# Patient Record
Sex: MALE | Race: WHITE | NOT HISPANIC OR LATINO | Employment: OTHER | URBAN - METROPOLITAN AREA
[De-identification: names, ages, dates, MRNs, and addresses within clinical notes are randomized per-mention and may not be internally consistent; named-entity substitution may affect disease eponyms.]

---

## 2023-08-28 ENCOUNTER — HOSPITAL ENCOUNTER (OUTPATIENT)
Dept: DATA CONVERSION | Facility: HOSPITAL | Age: 88
Discharge: HOME | End: 2023-08-28
Payer: MEDICARE

## 2023-08-28 DIAGNOSIS — D51.9 VITAMIN B12 DEFICIENCY ANEMIA, UNSPECIFIED: ICD-10-CM

## 2023-08-28 DIAGNOSIS — I10 ESSENTIAL (PRIMARY) HYPERTENSION: ICD-10-CM

## 2023-08-28 DIAGNOSIS — E78.00 PURE HYPERCHOLESTEROLEMIA, UNSPECIFIED: ICD-10-CM

## 2023-08-28 DIAGNOSIS — E11.9 TYPE 2 DIABETES MELLITUS WITHOUT COMPLICATIONS (MULTI): ICD-10-CM

## 2023-08-28 LAB
ALBUMIN SERPL-MCNC: 4.4 GM/DL (ref 3.5–5)
ALBUMIN/GLOB SERPL: 1.6 RATIO (ref 1.5–3)
ALP BLD-CCNC: 61 U/L (ref 35–125)
ALT SERPL-CCNC: 22 U/L (ref 5–40)
ANION GAP SERPL CALCULATED.3IONS-SCNC: 14 MMOL/L (ref 0–19)
APPEARANCE PLAS: ABNORMAL
AST SERPL-CCNC: 12 U/L (ref 5–40)
BACTERIA UR QL AUTO: NEGATIVE
BILIRUB DIRECT SERPL-MCNC: 0.2 MG/DL (ref 0–0.2)
BILIRUB INDIRECT SERPL-MCNC: 0.8 MG/DL (ref 0–0.8)
BILIRUB SERPL-MCNC: 1 MG/DL (ref 0.1–1.2)
BILIRUB UR QL STRIP.AUTO: NEGATIVE
BILIRUB UR QL STRIP.AUTO: NEGATIVE
BUN SERPL-MCNC: 23 MG/DL (ref 8–25)
BUN/CREAT SERPL: 17.7 RATIO (ref 8–21)
CALCIUM SERPL-MCNC: 9.8 MG/DL (ref 8.5–10.4)
CHLORIDE SERPL-SCNC: 107 MMOL/L (ref 97–107)
CHOLEST SERPL-MCNC: 213 MG/DL (ref 133–200)
CHOLEST/HDLC SERPL: 2.7 RATIO
CLARITY UR: CLEAR
CLARITY UR: CLEAR
CO2 SERPL-SCNC: 26 MMOL/L (ref 24–31)
COLOR SPUN FLD: ABNORMAL
COLOR UR: NORMAL
COLOR UR: YELLOW
CREAT SERPL-MCNC: 1.3 MG/DL (ref 0.4–1.6)
FASTING STATUS PATIENT QL REPORTED: ABNORMAL
GFR SERPL CREATININE-BSD FRML MDRD: 54 ML/MIN/1.73 M2
GLOBULIN SER-MCNC: 2.8 G/DL (ref 1.9–3.7)
GLUCOSE SERPL-MCNC: 114 MG/DL (ref 65–99)
GLUCOSE UR STRIP.AUTO-MCNC: NEGATIVE MG/DL
GLUCOSE UR STRIP.AUTO-MCNC: NEGATIVE MG/DL
HBA1C MFR BLD: 6.4 % (ref 4–6)
HDLC SERPL-MCNC: 78 MG/DL
HGB UR QL STRIP.AUTO: 1 /HPF (ref 0–3)
HGB UR QL: NEGATIVE
HGB UR QL: NEGATIVE
HYALINE CASTS UR QL AUTO: NORMAL /LPF
KETONES UR QL STRIP.AUTO: NEGATIVE
KETONES UR QL STRIP.AUTO: NEGATIVE
LDLC SERPL CALC-MCNC: 121 MG/DL (ref 65–130)
LEUKOCYTE ESTERASE UR QL STRIP.AUTO: NEGATIVE
LEUKOCYTE ESTERASE UR QL STRIP.AUTO: NEGATIVE
MICROSCOPIC (UA): NORMAL
NITRITE UR QL STRIP.AUTO: NEGATIVE
NITRITE UR QL STRIP.AUTO: NEGATIVE
PH UR STRIP.AUTO: 5.5 [PH] (ref 4.6–8)
PH UR STRIP.AUTO: 5.5 [PH] (ref 4.6–8)
POTASSIUM SERPL-SCNC: 4.8 MMOL/L (ref 3.4–5.1)
PROT SERPL-MCNC: 7.2 G/DL (ref 5.9–7.9)
PROT UR STRIP.AUTO-MCNC: NEGATIVE MG/DL
PROT UR STRIP.AUTO-MCNC: NEGATIVE MG/DL
REFLEX MICROSCOPIC (UA): NORMAL
SODIUM SERPL-SCNC: 146 MMOL/L (ref 133–145)
SP GR UR STRIP.AUTO: 1.02 (ref 1–1.03)
SP GR UR STRIP.AUTO: 1.02 (ref 1–1.03)
SQUAMOUS UR QL AUTO: NORMAL /HPF
TRIGL SERPL-MCNC: 71 MG/DL (ref 40–150)
URINE CULTURE: NORMAL
UROBILINOGEN UR QL STRIP.AUTO: NORMAL MG/DL (ref 0–1)
UROBILINOGEN UR QL STRIP.AUTO: NORMAL MG/DL (ref 0–1)
VIT B12 SERPL-MCNC: 1124 PG/ML (ref 211–946)
WBC #/AREA URNS AUTO: 1 /HPF (ref 0–3)

## 2023-09-13 PROBLEM — Z86.79 HISTORY OF HYPERTENSION: Status: ACTIVE | Noted: 2023-09-13

## 2023-09-13 PROBLEM — I25.10 CORONARY ARTERY DISEASE INVOLVING NATIVE CORONARY ARTERY OF NATIVE HEART WITHOUT ANGINA PECTORIS: Status: ACTIVE | Noted: 2023-09-13

## 2023-09-13 PROBLEM — J45.909 ASTHMA (HHS-HCC): Status: ACTIVE | Noted: 2023-09-13

## 2023-09-13 PROBLEM — M70.62 GREATER TROCHANTERIC BURSITIS OF LEFT HIP: Status: ACTIVE | Noted: 2023-09-13

## 2023-09-13 PROBLEM — Z86.11 HISTORY OF TUBERCULOSIS: Status: ACTIVE | Noted: 2023-09-13

## 2023-09-13 PROBLEM — Z95.5 HISTORY OF CORONARY ARTERY STENT PLACEMENT: Status: ACTIVE | Noted: 2023-09-13

## 2023-09-13 PROBLEM — I10 ESSENTIAL HYPERTENSION: Status: ACTIVE | Noted: 2023-09-13

## 2023-09-13 PROBLEM — C18.7 CANCER OF SIGMOID COLON (MULTI): Status: ACTIVE | Noted: 2023-09-13

## 2023-09-13 PROBLEM — M75.42 IMPINGEMENT SYNDROME, SHOULDER, LEFT: Status: ACTIVE | Noted: 2023-09-13

## 2023-09-13 PROBLEM — I11.9 BENIGN HYPERTENSIVE HEART DISEASE WITHOUT HEART FAILURE: Status: ACTIVE | Noted: 2023-09-13

## 2023-09-13 PROBLEM — K56.699 STRICTURE OF COLON (MULTI): Status: ACTIVE | Noted: 2023-09-13

## 2023-09-13 PROBLEM — I35.8 AORTIC VALVE SCLEROSIS: Status: ACTIVE | Noted: 2023-09-13

## 2023-09-13 PROBLEM — E78.5 HYPERLIPIDEMIA: Status: ACTIVE | Noted: 2023-09-13

## 2023-09-13 RX ORDER — NIRMATRELVIR AND RITONAVIR 150-100 MG
KIT ORAL
COMMUNITY
Start: 2023-03-21 | End: 2023-10-04 | Stop reason: ALTCHOICE

## 2023-09-13 RX ORDER — EPINEPHRINE 0.22MG
AEROSOL WITH ADAPTER (ML) INHALATION
COMMUNITY

## 2023-09-13 RX ORDER — ALBUTEROL SULFATE 90 UG/1
AEROSOL, METERED RESPIRATORY (INHALATION)
COMMUNITY
Start: 2020-12-07

## 2023-09-13 RX ORDER — AMOXICILLIN 500 MG/1
CAPSULE ORAL
COMMUNITY
Start: 2023-07-15 | End: 2023-10-04 | Stop reason: ALTCHOICE

## 2023-09-13 RX ORDER — SIMVASTATIN 20 MG/1
TABLET, FILM COATED ORAL
COMMUNITY
End: 2023-11-17

## 2023-09-13 RX ORDER — LOTEPREDNOL ETABONATE 3.8 MG/G
GEL OPHTHALMIC
COMMUNITY
Start: 2021-12-13 | End: 2023-10-04 | Stop reason: ALTCHOICE

## 2023-09-13 RX ORDER — PNV NO.95/FERROUS FUM/FOLIC AC 28MG-0.8MG
TABLET ORAL
COMMUNITY
End: 2023-10-04 | Stop reason: ALTCHOICE

## 2023-09-13 RX ORDER — KETOTIFEN FUMARATE 0.35 MG/ML
SOLUTION/ DROPS OPHTHALMIC
COMMUNITY
End: 2024-04-24 | Stop reason: WASHOUT

## 2023-09-13 RX ORDER — ALBUTEROL SULFATE 4 MG/1
4 TABLET ORAL DAILY
COMMUNITY
End: 2024-02-06

## 2023-09-13 RX ORDER — METHYLPREDNISOLONE 4 MG/1
TABLET ORAL
COMMUNITY
Start: 2021-08-10 | End: 2023-10-04 | Stop reason: ALTCHOICE

## 2023-09-13 RX ORDER — CEPHALEXIN 500 MG/1
CAPSULE ORAL
COMMUNITY
End: 2023-10-04 | Stop reason: ALTCHOICE

## 2023-09-13 RX ORDER — TAMSULOSIN HYDROCHLORIDE 0.4 MG/1
0.4 CAPSULE ORAL DAILY
COMMUNITY

## 2023-09-13 RX ORDER — AMLODIPINE BESYLATE 5 MG/1
5 TABLET ORAL DAILY
COMMUNITY
End: 2023-11-11

## 2023-09-13 RX ORDER — NAPROXEN SODIUM 220 MG/1
TABLET, FILM COATED ORAL
COMMUNITY

## 2023-09-13 RX ORDER — CYANOCOBALAMIN 1000 UG/ML
INJECTION, SOLUTION INTRAMUSCULAR; SUBCUTANEOUS
COMMUNITY
End: 2023-10-30

## 2023-09-13 RX ORDER — FLUTICASONE PROPIONATE AND SALMETEROL 100; 50 UG/1; UG/1
POWDER RESPIRATORY (INHALATION)
COMMUNITY
Start: 2021-10-29 | End: 2023-10-04 | Stop reason: ALTCHOICE

## 2023-09-18 ENCOUNTER — DOCUMENTATION (OUTPATIENT)
Dept: PRIMARY CARE | Facility: CLINIC | Age: 88
End: 2023-09-18
Payer: MEDICARE

## 2023-09-25 ENCOUNTER — HOSPITAL ENCOUNTER (OUTPATIENT)
Dept: DATA CONVERSION | Facility: HOSPITAL | Age: 88
Discharge: HOME | End: 2023-09-25
Payer: MEDICARE

## 2023-09-30 DIAGNOSIS — G89.29 CHRONIC PAIN OF LEFT KNEE: Primary | ICD-10-CM

## 2023-09-30 DIAGNOSIS — M25.552 LEFT HIP PAIN: Chronic | ICD-10-CM

## 2023-09-30 DIAGNOSIS — Z96.652 PRESENCE OF LEFT ARTIFICIAL KNEE JOINT: ICD-10-CM

## 2023-09-30 DIAGNOSIS — M25.562 CHRONIC PAIN OF LEFT KNEE: Primary | ICD-10-CM

## 2023-10-04 ENCOUNTER — APPOINTMENT (OUTPATIENT)
Dept: PHYSICAL THERAPY | Facility: CLINIC | Age: 88
End: 2023-10-04
Payer: MEDICARE

## 2023-10-04 ENCOUNTER — OFFICE VISIT (OUTPATIENT)
Dept: PRIMARY CARE | Facility: CLINIC | Age: 88
End: 2023-10-04
Payer: MEDICARE

## 2023-10-04 VITALS
SYSTOLIC BLOOD PRESSURE: 124 MMHG | BODY MASS INDEX: 26.66 KG/M2 | HEART RATE: 87 BPM | HEIGHT: 69 IN | OXYGEN SATURATION: 96 % | TEMPERATURE: 98.4 F | DIASTOLIC BLOOD PRESSURE: 70 MMHG | WEIGHT: 180 LBS

## 2023-10-04 DIAGNOSIS — J06.9 ACUTE UPPER RESPIRATORY INFECTION: Primary | ICD-10-CM

## 2023-10-04 DIAGNOSIS — R05.1 ACUTE COUGH: ICD-10-CM

## 2023-10-04 PROCEDURE — 1159F MED LIST DOCD IN RCRD: CPT | Performed by: FAMILY MEDICINE

## 2023-10-04 PROCEDURE — 1160F RVW MEDS BY RX/DR IN RCRD: CPT | Performed by: FAMILY MEDICINE

## 2023-10-04 PROCEDURE — 1036F TOBACCO NON-USER: CPT | Performed by: FAMILY MEDICINE

## 2023-10-04 PROCEDURE — 1126F AMNT PAIN NOTED NONE PRSNT: CPT | Performed by: FAMILY MEDICINE

## 2023-10-04 PROCEDURE — 3074F SYST BP LT 130 MM HG: CPT | Performed by: FAMILY MEDICINE

## 2023-10-04 PROCEDURE — 99212 OFFICE O/P EST SF 10 MIN: CPT | Performed by: FAMILY MEDICINE

## 2023-10-04 PROCEDURE — 3078F DIAST BP <80 MM HG: CPT | Performed by: FAMILY MEDICINE

## 2023-10-04 RX ORDER — LISINOPRIL 5 MG/1
TABLET ORAL
COMMUNITY
End: 2024-04-24 | Stop reason: WASHOUT

## 2023-10-04 ASSESSMENT — PAIN SCALES - GENERAL: PAINLEVEL: 0-NO PAIN

## 2023-10-04 ASSESSMENT — PATIENT HEALTH QUESTIONNAIRE - PHQ9
2. FEELING DOWN, DEPRESSED OR HOPELESS: NOT AT ALL
1. LITTLE INTEREST OR PLEASURE IN DOING THINGS: NOT AT ALL
SUM OF ALL RESPONSES TO PHQ9 QUESTIONS 1 AND 2: 0

## 2023-10-04 ASSESSMENT — ENCOUNTER SYMPTOMS: COUGH: 1

## 2023-10-04 NOTE — PROGRESS NOTES
"Subjective   Patient ID: Buck Leiva is a 88 y.o. male who presents for Cough (X 3 days ) and Nasal Congestion.  Today is day 4 of the illness.  He admits to a productive cough with yellow and green sputum (only in the morning) and nasal congestion.  He denies rhinorrhea, SOB, fever, chills, throat pain, otalgia, ear pressure, sinus pressure, and headaches.  He is taking Mucinex which helps.  He is using the albuterol inhaler 4 times a day, not so much as he needs but because it says to use 4 times a day on the inhaler.  He denies sick contacts.  He took a home COVID-19 test yesterday, and it was negative.      Review of Systems   HENT:  Positive for congestion.    Respiratory:  Positive for cough.      Objective   /70 (BP Location: Left arm)   Pulse 87   Temp 36.9 °C (98.4 °F) (Temporal)   Ht 1.753 m (5' 9\")   Wt 81.6 kg (180 lb)   SpO2 96%   BMI 26.58 kg/m²     Physical Exam  Constitutional:       Appearance: Normal appearance.   HENT:      Right Ear: Hearing, tympanic membrane, ear canal and external ear normal.      Left Ear: Hearing, tympanic membrane, ear canal and external ear normal.      Nose: Congestion present. No rhinorrhea.      Right Turbinates: Swollen.      Left Turbinates: Swollen.      Comments: Nasal mucosa erythematous.       Mouth/Throat:      Mouth: Mucous membranes are moist.      Pharynx: Oropharynx is clear.   Eyes:      Conjunctiva/sclera: Conjunctivae normal.   Cardiovascular:      Rate and Rhythm: Normal rate and regular rhythm.   Pulmonary:      Effort: Pulmonary effort is normal.      Breath sounds: Normal breath sounds.   Neurological:      Mental Status: He is alert.       Assessment/Plan   Problem List Items Addressed This Visit    None  Visit Diagnoses         Codes    Acute upper respiratory infection    -  Primary J06.9    Acute cough     R05.1        New.  Home COVID-19 test negative.  Most likely viral.    Continue with Mucinex.  Recommended OTC Flonase.   Use " albuterol only as needed for SOB and coughing episodes.    Adequate hydration.  Get plenty of rest.  Follow up in 3-5 days with PCP if symptoms worsen.

## 2023-10-10 ENCOUNTER — TREATMENT (OUTPATIENT)
Dept: PHYSICAL THERAPY | Facility: CLINIC | Age: 88
End: 2023-10-10
Payer: MEDICARE

## 2023-10-10 DIAGNOSIS — Z96.652 PRESENCE OF LEFT ARTIFICIAL KNEE JOINT: ICD-10-CM

## 2023-10-10 DIAGNOSIS — M25.562 CHRONIC PAIN OF LEFT KNEE: Primary | ICD-10-CM

## 2023-10-10 DIAGNOSIS — M25.552 LEFT HIP PAIN: Chronic | ICD-10-CM

## 2023-10-10 DIAGNOSIS — G89.29 CHRONIC PAIN OF LEFT KNEE: Primary | ICD-10-CM

## 2023-10-10 DIAGNOSIS — M54.50 LOW BACK PAIN: ICD-10-CM

## 2023-10-10 PROCEDURE — 97110 THERAPEUTIC EXERCISES: CPT | Mod: GP,KX | Performed by: PHYSICAL THERAPIST

## 2023-10-10 ASSESSMENT — PAIN - FUNCTIONAL ASSESSMENT: PAIN_FUNCTIONAL_ASSESSMENT: 0-10

## 2023-10-10 NOTE — PROGRESS NOTES
Physical Therapy Treatment    Patient Name: Buck Leiva  MRN: [id number]  Today's Date: 10/10/2023  Time Calculation  Start Time: 1415 (visit 2/10)  Stop Time: 1500  Time Calculation (min): 45 min  PT Therapeutic Procedures Time Entry  Therapeutic Exercise Time Entry: 40  Visit 2 of 10  Current Problem   1. Chronic pain of left knee        2. Low back pain  PT eval and treat      3. Left hip pain        4. Presence of left artificial knee joint            Subjective   General   General Comment: Pt states he is not having trouble with HEP, missed his last appt 2/2 feeling under the weather.  Pain   Pain Assessment: 0-10    Objective   Findings: Lumbar hypomobility and muscle imbalances, L knee stiffness s/p TKA (11/16/22), impaired gait and balance    Treatments:  Therapeutic Exercise: 40 minutes  Tband sidesteps and monsters -red 2 laps ea  Lean on counter hip ext 2x10- red  STS without UE 1x10, 1x8 muscle fatigue  7in step ups 2x10 with UE fwd  HR/TR's  LTR  Foam toe taps  Hurdles F/L   DKTC stretches (no bouncing)  Pirif stretching with opposite bent knee  PPT, PPT with hip add, PPT with march  DL bridge  MANUAL:  Knee ext mobilizations  5 min  Assessment:   PT Assessment  Assessment Comment: Pt tolerated session well, needs frequent cues forproper exercise form and body mechanics.Decreased knee extension.     Plan:   OP PT Plan  Treatment/Interventions: Education/ Instruction, Electrical stimulation, Dry needling, Manual therapy, Neuromuscular re-education, Self care/ home management, Therapeutic exercises, Ultrasound  PT Plan: Skilled PT  PT Frequency: 2 times per week  Duration: 6-8wks    Goals:     LTGs to be completed by 10 visits:  1) Pt will score less than 10% impairment on Low Back Disability Questionnaire to indicate significant improvement in low back function.  2) Pt will improve bilateral hip strength to at least 4+/5 for all major muscle groups for improved functional strength with general mobility,  walking, and stairs  3) Pt will display symmetrical pain-free gait without AD and without compensation.  4) Pt will display L knee ROM of at least 0-120 for less difficulty walking and with general mobility.

## 2023-10-10 NOTE — PROGRESS NOTES
Subjective      Chief Complaint   Patient presents with    annual appt        HPI seen to evaluate his known coronary artery disease and cardiomyopathy, and he remains angina free with no signs of heart failure.    Previous note: Has a history of remote circumflex stenting in 2008, and was instructed to have nuclear stress testing at his previous visit.  He is nuclear stress test October 27, 2022 showed no evidence of stress-induced ischemia and left ventricular ejection fraction estimated at 70%.    Review of Systems   All other systems reviewed and are negative.       Objective   Physical Exam  Constitutional:       Appearance: Normal appearance.   HENT:      Head: Normocephalic and atraumatic.   Eyes:      Pupils: Pupils are equal, round, and reactive to light.   Cardiovascular:      Rate and Rhythm: Normal rate and regular rhythm.      Pulses: Normal pulses.      Heart sounds: Normal heart sounds.   Pulmonary:      Effort: Pulmonary effort is normal.      Breath sounds: Normal breath sounds.   Abdominal:      General: Abdomen is flat. Bowel sounds are normal.      Palpations: Abdomen is soft.   Musculoskeletal:         General: Normal range of motion.      Cervical back: Normal range of motion.   Skin:     General: Skin is warm and dry.   Neurological:      General: No focal deficit present.   Psychiatric:         Mood and Affect: Mood normal.         Judgment: Judgment normal.          Lab Review:   Not applicable    History of coronary artery stent placement  Stable cardiac status without angina and will continue on his present medication including aspirin and statin therapy.    Essential hypertension  Continue a commitment to daily walking/exercise and a low carbohydrate [place]/Mediterranean dietary lifestyle for hypertension management cardiac risk reduction

## 2023-10-12 ENCOUNTER — TREATMENT (OUTPATIENT)
Dept: PHYSICAL THERAPY | Facility: CLINIC | Age: 88
End: 2023-10-12
Payer: MEDICARE

## 2023-10-12 DIAGNOSIS — M25.552 LEFT HIP PAIN: Chronic | ICD-10-CM

## 2023-10-12 DIAGNOSIS — G89.29 CHRONIC PAIN OF LEFT KNEE: Primary | ICD-10-CM

## 2023-10-12 DIAGNOSIS — M25.562 CHRONIC PAIN OF LEFT KNEE: Primary | ICD-10-CM

## 2023-10-12 DIAGNOSIS — Z96.652 PRESENCE OF LEFT ARTIFICIAL KNEE JOINT: ICD-10-CM

## 2023-10-12 DIAGNOSIS — M54.50 LOW BACK PAIN: ICD-10-CM

## 2023-10-12 PROCEDURE — 97110 THERAPEUTIC EXERCISES: CPT | Mod: GP,KX | Performed by: PHYSICAL THERAPIST

## 2023-10-12 ASSESSMENT — PAIN - FUNCTIONAL ASSESSMENT: PAIN_FUNCTIONAL_ASSESSMENT: 0-10

## 2023-10-12 ASSESSMENT — PAIN SCALES - GENERAL: PAINLEVEL_OUTOF10: 0 - NO PAIN

## 2023-10-12 NOTE — PROGRESS NOTES
Physical Therapy Treatment    Patient Name: Buck Leiva  MRN: [id number]  Today's Date: 10/12/2023  Time Calculation  Start Time: 1500  Stop Time: 1548  Time Calculation (min): 48 min  PT Therapeutic Procedures Time Entry  Therapeutic Exercise Time Entry: 48  Visit 3 of 10  Current Problem   1. Chronic pain of left knee        2. Low back pain  PT eval and treat      3. Left hip pain        4. Presence of left artificial knee joint            Subjective   General   Reason for Referral: L knee pain  General Comment: Pt reports he was sore after  last visit, but it was muscle soreness, not knee pain.  Pain   Pain Assessment: 0-10  Pain Score: 0 - No pain    Objective   Findings: Lumbar hypomobility and muscle imbalances, L knee stiffness s/p TKA (11/16/22), impaired gait and balance    Treatments:  Therapeutic Exercise: 40 minutes  Tband sidesteps and monsters -red 2 laps ea  Lean on counter hip ext 2x10- red  STS without UE 1x10, 1x8 muscle fatigue  7in step ups 2x10 with UE fwd  Shuttle press 87.5-150# x 12 ea DBL, SL 62.5# 2x12 L  HR/TR's 3 way x10ea  LTR  Foam toe taps  Hurdles F/L 6in  Pirif stretching with opposite bent knee  PPT, PPT with hip add, PPT with march, SLR's  Tball DKTC stretches (no bouncing) x20  DL bridge x10  DL bridge with green band around knees 2x10  Tball bridge ball at feet to promote knee extension 2x12  MANUAL:  Knee ext mobilizations  5 min  Assessment:   PT Assessment  Assessment Comment: Pt tolerated session well, needs frequent cues for proper exercise form and body mechanics. Pt denied any knee pain during todays session.Decreased knee extension.     Plan:   OP PT Plan  Treatment/Interventions: Education/ Instruction, Electrical stimulation, Dry needling, Manual therapy, Neuromuscular re-education, Self care/ home management, Therapeutic exercises, Ultrasound  PT Plan: Skilled PT  PT Frequency: 2 times per week  Duration: 6-8wks    Goals:   Active       PT Problem       STG's        Start:  09/30/23    Expected End:  10/14/23       - Independent with HEP.         LTG's       Start:  09/30/23    Expected End:  11/25/23       1) Pt will score less than 10% impairment on Low Back Disability Questionnaire to indicate significant improvement in low back function.  2) Pt will improve bilateral hip strength to at least 4+/5 for all major muscle groups for improved functional strength with general mobility, walking, and stairs  3) Pt will display symmetrical pain-free gait without AD and without compensation.  4) Pt will display L knee ROM of at least 0-120 for less difficulty walking and with general mobility.           LTGs to be completed by 10 visits:

## 2023-10-16 ENCOUNTER — OFFICE VISIT (OUTPATIENT)
Dept: CARDIOLOGY | Facility: CLINIC | Age: 88
End: 2023-10-16
Payer: MEDICARE

## 2023-10-16 ENCOUNTER — PATIENT OUTREACH (OUTPATIENT)
Dept: PRIMARY CARE | Facility: CLINIC | Age: 88
End: 2023-10-16

## 2023-10-16 VITALS
DIASTOLIC BLOOD PRESSURE: 84 MMHG | WEIGHT: 178 LBS | OXYGEN SATURATION: 96 % | BODY MASS INDEX: 26.29 KG/M2 | HEART RATE: 82 BPM | SYSTOLIC BLOOD PRESSURE: 140 MMHG

## 2023-10-16 DIAGNOSIS — J44.9 CHRONIC OBSTRUCTIVE PULMONARY DISEASE, UNSPECIFIED COPD TYPE (MULTI): ICD-10-CM

## 2023-10-16 DIAGNOSIS — Z95.5 HISTORY OF CORONARY ARTERY STENT PLACEMENT: ICD-10-CM

## 2023-10-16 DIAGNOSIS — I25.10 CORONARY ARTERY DISEASE INVOLVING NATIVE CORONARY ARTERY OF NATIVE HEART WITHOUT ANGINA PECTORIS: Primary | ICD-10-CM

## 2023-10-16 DIAGNOSIS — E78.5 HYPERLIPIDEMIA, UNSPECIFIED HYPERLIPIDEMIA TYPE: ICD-10-CM

## 2023-10-16 DIAGNOSIS — M19.90 ARTHRITIS: ICD-10-CM

## 2023-10-16 DIAGNOSIS — I10 ESSENTIAL HYPERTENSION: ICD-10-CM

## 2023-10-16 DIAGNOSIS — C18.7 CANCER OF SIGMOID COLON (MULTI): ICD-10-CM

## 2023-10-16 DIAGNOSIS — I25.119 CORONARY ARTERY DISEASE WITH ANGINA PECTORIS, UNSPECIFIED VESSEL OR LESION TYPE, UNSPECIFIED WHETHER NATIVE OR TRANSPLANTED HEART (CMS-HCC): ICD-10-CM

## 2023-10-16 DIAGNOSIS — I10 HYPERTENSION, UNSPECIFIED TYPE: ICD-10-CM

## 2023-10-16 PROCEDURE — 3079F DIAST BP 80-89 MM HG: CPT | Performed by: INTERNAL MEDICINE

## 2023-10-16 PROCEDURE — 1160F RVW MEDS BY RX/DR IN RCRD: CPT | Performed by: INTERNAL MEDICINE

## 2023-10-16 PROCEDURE — 1159F MED LIST DOCD IN RCRD: CPT | Performed by: INTERNAL MEDICINE

## 2023-10-16 PROCEDURE — 3077F SYST BP >= 140 MM HG: CPT | Performed by: INTERNAL MEDICINE

## 2023-10-16 PROCEDURE — 99214 OFFICE O/P EST MOD 30 MIN: CPT | Performed by: INTERNAL MEDICINE

## 2023-10-16 PROCEDURE — 1126F AMNT PAIN NOTED NONE PRSNT: CPT | Performed by: INTERNAL MEDICINE

## 2023-10-16 PROCEDURE — 1036F TOBACCO NON-USER: CPT | Performed by: INTERNAL MEDICINE

## 2023-10-16 ASSESSMENT — PAIN SCALES - GENERAL: PAINLEVEL: 0-NO PAIN

## 2023-10-16 NOTE — PATIENT INSTRUCTIONS
History of coronary artery stent placement  Stable cardiac status without angina and will continue on his present medication including aspirin and statin therapy.    Essential hypertension  Continue a commitment to daily walking/exercise and a low carbohydrate [place]/Mediterranean dietary lifestyle for hypertension management cardiac risk reduction    With me in 9 months or sooner for active cardiac issues

## 2023-10-16 NOTE — PROGRESS NOTES
Spoke with patient spouse who states they saw cardiologist today and it went well, follow up again in 9 months. Patient has started physical therapy again for his L knee, lower back pain and L hip pain. Patient continues with cough and some drainage, spouse states she now has it also and knows it may take some time to go away. No concerns at this time and agreeable to follow up calls.

## 2023-10-16 NOTE — ASSESSMENT & PLAN NOTE
Stable cardiac status without angina and will continue on his present medication including aspirin and statin therapy.

## 2023-10-16 NOTE — ASSESSMENT & PLAN NOTE
Continue a commitment to daily walking/exercise and a low carbohydrate [place]/Mediterranean dietary lifestyle for hypertension management cardiac risk reduction

## 2023-10-17 ENCOUNTER — TREATMENT (OUTPATIENT)
Dept: PHYSICAL THERAPY | Facility: CLINIC | Age: 88
End: 2023-10-17
Payer: MEDICARE

## 2023-10-17 DIAGNOSIS — Z96.652 PRESENCE OF LEFT ARTIFICIAL KNEE JOINT: ICD-10-CM

## 2023-10-17 DIAGNOSIS — M25.562 CHRONIC PAIN OF LEFT KNEE: Primary | ICD-10-CM

## 2023-10-17 DIAGNOSIS — M54.50 LOW BACK PAIN: ICD-10-CM

## 2023-10-17 DIAGNOSIS — G89.29 CHRONIC PAIN OF LEFT KNEE: Primary | ICD-10-CM

## 2023-10-17 DIAGNOSIS — M25.552 LEFT HIP PAIN: Chronic | ICD-10-CM

## 2023-10-17 PROCEDURE — 97140 MANUAL THERAPY 1/> REGIONS: CPT | Mod: GP,KX

## 2023-10-17 PROCEDURE — 97110 THERAPEUTIC EXERCISES: CPT | Mod: GP,KX

## 2023-10-17 NOTE — PROGRESS NOTES
"Physical Therapy Treatment    Patient Name: Buck Leiva  MRN: [id number]  Today's Date: 10/17/2023  Visit 4 of 10  Time Calculation  Start Time: 1345  Stop Time: 1430  Time Calculation (min): 45 min  PT Therapeutic Procedures Time Entry  Manual Therapy Time Entry: 10  Therapeutic Exercise Time Entry: 30   Current Problem   1. Chronic pain of left knee        2. Low back pain  PT eval and treat      3. Left hip pain        4. Presence of left artificial knee joint            Subjective   General    Patient feeling fatigued today. Had covid shot a few days ago and has felt wiped out ever since. Hasn't done his HEP over the past few days.   Pain    No pain, just stiff.    Objective   Findings: Lumbar hypomobility and muscle imbalances, L knee stiffness s/p TKA (11/16/22), impaired gait and balance. R trunk lean with standing and ambulation. Cane on R.    Treatments:  Therapeutic Exercise:  NuStep L5 - 6 min  Standing hip abd x10  Tband sidesteps and monsters -red 2 laps ea  Lean on counter hip ext 2x10- red  STS without UE 2x10  LTR  SKTC stretch 2x30\" R/L  Tball DKTC (no bouncing) x20  Tball bridge ball at feet to promote knee extension 2x12  Open book x12 R/L  PPT  DL bridge x12  HL Blue Tband clam x20  DL bridge with blue band around knees x12    DNP  7in step ups 2x10 with UE fwd  Shuttle press 87.5-150# x 12 ea DBL, SL 62.5# 2x12 L  HR/TR's 3 way x10ea  Foam toe taps  Hurdles F/L 6in  Pirif stretching with opposite bent knee  PPT, PPT with hip add, PPT with march, SLR's    MANUAL:  Knee ext mobilizations, patellar mobs    Assessment:    Patient requires frequent VC to slow down, and to not bounce into end-range motion or stretching. L trunk lean with weight bearing activities. Decreased knee extension, treated and improved with knee ext mobs. Tolerated MT and exercise additions well.     Plan:    Continue core/LE strength and mobility.     Goals:   Active       PT Problem       STG's       Start:  09/30/23    " Expected End:  10/14/23       - Independent with HEP.         LTG's       Start:  09/30/23    Expected End:  11/25/23       1) Pt will score less than 10% impairment on Low Back Disability Questionnaire to indicate significant improvement in low back function.  2) Pt will improve bilateral hip strength to at least 4+/5 for all major muscle groups for improved functional strength with general mobility, walking, and stairs  3) Pt will display symmetrical pain-free gait without AD and without compensation.  4) Pt will display L knee ROM of at least 0-120 for less difficulty walking and with general mobility.           LTGs to be completed by 10 visits:

## 2023-10-19 ENCOUNTER — TREATMENT (OUTPATIENT)
Dept: PHYSICAL THERAPY | Facility: CLINIC | Age: 88
End: 2023-10-19
Payer: MEDICARE

## 2023-10-19 DIAGNOSIS — M54.50 LOW BACK PAIN: ICD-10-CM

## 2023-10-19 DIAGNOSIS — M25.552 LEFT HIP PAIN: Chronic | ICD-10-CM

## 2023-10-19 DIAGNOSIS — M25.562 CHRONIC PAIN OF LEFT KNEE: Primary | ICD-10-CM

## 2023-10-19 DIAGNOSIS — Z96.652 PRESENCE OF LEFT ARTIFICIAL KNEE JOINT: ICD-10-CM

## 2023-10-19 DIAGNOSIS — G89.29 CHRONIC PAIN OF LEFT KNEE: Primary | ICD-10-CM

## 2023-10-19 PROCEDURE — 97110 THERAPEUTIC EXERCISES: CPT | Mod: GP,KX

## 2023-10-19 NOTE — PROGRESS NOTES
"Physical Therapy Treatment    Patient Name: Buck Leiva  MRN: [id number]  Today's Date: 10/19/2023  Visit 5 of 10  Time Calculation  Start Time: 1520  Stop Time: 1600  Time Calculation (min): 40 min  PT Therapeutic Procedures Time Entry  Therapeutic Exercise Time Entry: 40   Current Problem   1. Chronic pain of left knee        2. Low back pain  PT eval and treat      3. Left hip pain        4. Presence of left artificial knee joint          Precautions:  L TKA on 11/16/22, recent fall leading up to therapy. Patient is forgetful and requires frequent verbal and tactile cues for proper exercise technique and sequencing with activities.    Subjective   General    Doing better today, still tired since recent covid shot.   Pre-treatment Pain:    No pain, just stiff in the back    Objective   Findings: Lumbar hypomobility and muscle imbalances, L hip weakness and L knee stiffness s/p TKA (11/16/22), impaired gait and balance. R trunk lean with standing and ambulation. Cane on R.    Treatments:  Therapeutic Exercise:  NuStep L6 - 6 min  HSC 50# 2x10   Leg press 30-40-50# 3x12  Standing hip abd at couter 2x10  Tband sidesteps and fw monsters -yellow 2 laps ea  Lean on counter hip ext 2x10- red  HR/TR's 3 way x10ea  Foam 5\" step taps (light to no UE)  7in step ups 2x10 with UE fwd  LTR  Tball DKTC (no bouncing) x20  Tball bridge ball x15  PPT x15    DNP  STS without UE 2x10  SKTC stretch 2x30\" R/L  Open book x12 R/L  DL bridge x12  HL Blue Tband clam x20  DL bridge with blue band around knees x12  Shuttle press 87.5-150# x 12 ea DBL, SL 62.5# 2x12 L  Hurdles F/L 6in  Pirif stretching with opposite bent knee    MANUAL:  Knee ext mobilizations, patellar mobs    Assessment:     Patient lacks core and functional L hip and LE strength. L trunk lean with weight bearing activities. Heavier UE assist with L step ups.     Plan:    Continue general core/LE strength and mobility.     Goals:   Active       PT Problem       STG's       " Start:  09/30/23    Expected End:  10/14/23       - Independent with HEP.         LTG's       Start:  09/30/23    Expected End:  11/25/23       1) Pt will score less than 10% impairment on Low Back Disability Questionnaire to indicate significant improvement in low back function.  2) Pt will improve bilateral hip strength to at least 4+/5 for all major muscle groups for improved functional strength with general mobility, walking, and stairs  3) Pt will display symmetrical pain-free gait without AD and without compensation.  4) Pt will display L knee ROM of at least 0-120 for less difficulty walking and with general mobility.

## 2023-10-24 ENCOUNTER — TREATMENT (OUTPATIENT)
Dept: PHYSICAL THERAPY | Facility: CLINIC | Age: 88
End: 2023-10-24
Payer: MEDICARE

## 2023-10-24 DIAGNOSIS — M25.562 CHRONIC PAIN OF LEFT KNEE: Primary | ICD-10-CM

## 2023-10-24 DIAGNOSIS — M25.552 LEFT HIP PAIN: Chronic | ICD-10-CM

## 2023-10-24 DIAGNOSIS — M54.50 LOW BACK PAIN: ICD-10-CM

## 2023-10-24 DIAGNOSIS — G89.29 CHRONIC PAIN OF LEFT KNEE: Primary | ICD-10-CM

## 2023-10-24 DIAGNOSIS — Z96.652 PRESENCE OF LEFT ARTIFICIAL KNEE JOINT: ICD-10-CM

## 2023-10-24 PROCEDURE — 97110 THERAPEUTIC EXERCISES: CPT | Mod: GP,KX

## 2023-10-24 NOTE — PROGRESS NOTES
"Physical Therapy Treatment    Patient Name: Buck Leiva  MRN: [id number]  Today's Date: 10/24/2023  Visit 6 of 10  Time Calculation  Start Time: 1300  Stop Time: 1345  Time Calculation (min): 45 min  PT Therapeutic Procedures Time Entry  Therapeutic Exercise Time Entry: 42   Current Problem   1. Chronic pain of left knee        2. Low back pain  PT eval and treat      3. Left hip pain        4. Presence of left artificial knee joint        Precautions:  L TKA on 11/16/22, recent fall leading up to therapy. Patient is forgetful and requires frequent verbal and tactile cues for proper exercise technique and sequencing with activities.    Subjective   General    Back is feeling better lately. Thinks the clinic and home exercises are helping.   Pre-treatment Pain:    No pain, just stiff in the back    Objective   Findings: Lumbar hypomobility and muscle imbalances, L hip weakness and L knee stiffness s/p TKA (11/16/22), impaired gait and balance. R trunk lean with standing and ambulation. Cane on R.    Treatments:  Therapeutic Exercise:  Rec Bike L5 - 6 min  Tband sidesteps and fw monsters - orange 2 laps ea  Lean on counter hip ext 3x10  HSC 50# 3x10   Leg press 30-40-50# 3x12  HR/TR's 2x15 ea  Standing row FT 7.5# 2x12   Standing FT stirs 7.5# x10 R/L  LTR  Tball DKTC (no bouncing) 2x20  Tball bridge ball 2x15  PPT x15  SAQ 4# 2x15  Active SLR 2x10 (ext lag)    DNP  Foam 5\" step taps (light to no UE)  7in step ups 2x10 with UE fwd  STS without UE 2x10  SKTC stretch 2x30\" R/L  Open book x12 R/L  DL bridge x12  HL Blue Tband clam x20  DL bridge with blue band around knees x12  Shuttle press 87.5-150# x 12 ea DBL, SL 62.5# 2x12 L  Hurdles F/L 6in  Pirif stretching with opposite bent knee    MANUAL:  Knee ext mobilizations, patellar mobs    Assessment:     Fairly good tolerance to exercise progressions. Tcs and Vcs for proper core activation and maintaining good posture with FT rows and stirs. Continues to lack TKE ROM " and strength, with slight extensor lag observed with SLR.     Plan:    Continue general core/LE strength and mobility.     Goals:   Active       PT Problem       STG's       Start:  09/30/23    Expected End:  10/14/23       - Independent with HEP.         LTG's       Start:  09/30/23    Expected End:  11/25/23       1) Pt will score less than 10% impairment on Low Back Disability Questionnaire to indicate significant improvement in low back function.  2) Pt will improve bilateral hip strength to at least 4+/5 for all major muscle groups for improved functional strength with general mobility, walking, and stairs  3) Pt will display symmetrical pain-free gait without AD and without compensation.  4) Pt will display L knee ROM of at least 0-120 for less difficulty walking and with general mobility.

## 2023-10-26 ENCOUNTER — TREATMENT (OUTPATIENT)
Dept: PHYSICAL THERAPY | Facility: CLINIC | Age: 88
End: 2023-10-26
Payer: MEDICARE

## 2023-10-26 DIAGNOSIS — M25.552 LEFT HIP PAIN: Chronic | ICD-10-CM

## 2023-10-26 DIAGNOSIS — G89.29 CHRONIC PAIN OF LEFT KNEE: Primary | ICD-10-CM

## 2023-10-26 DIAGNOSIS — Z96.652 PRESENCE OF LEFT ARTIFICIAL KNEE JOINT: ICD-10-CM

## 2023-10-26 DIAGNOSIS — M25.562 CHRONIC PAIN OF LEFT KNEE: Primary | ICD-10-CM

## 2023-10-26 PROCEDURE — 97110 THERAPEUTIC EXERCISES: CPT | Mod: GP,CQ,KX

## 2023-10-26 NOTE — PROGRESS NOTES
"Physical Therapy Treatment    Patient Name: Buck Leiva  MRN: [id number]  Today's Date: 10/26/2023  Visit 7 of 10  Time Calculation  Start Time: 1118  Stop Time: 1200  Time Calculation (min): 42 min  PT Therapeutic Procedures Time Entry  Therapeutic Exercise Time Entry: 40   Current Problem   1. Chronic pain of left knee        2. Left hip pain        3. Presence of left artificial knee joint          Precautions:  L TKA on 11/16/22, recent fall leading up to therapy. Patient is forgetful and requires frequent verbal and tactile cues for proper exercise technique and sequencing with activities.    Subjective   General    Getting better he thinks, defiantly not getting any worse. Pt states he has no pain, just really stiff in low back.  Pre-treatment Pain:    No pain, just stiff in the back    Objective   Findings: Lumbar hypomobility and muscle imbalances, L hip weakness and L knee stiffness s/p TKA (11/16/22), impaired gait and balance. R trunk lean with standing and ambulation. Cane on R.    Treatments:  Therapeutic Exercise:  Nustep L5 6 min  HSC 50# 2x10 , 55-60# 2x10  Leg press 30-40-50# 3x12  7in step ups 2x12 with UE fwd  Foam 5\" step taps (light to no UE)  HR/TR's 2x15 ea  Tband sidesteps and fw monsters - orange 2 laps ea  Active SLR 2x10 (ext lag)  LTR x20    DNP:  Lean on counter hip ext 3x10  PPT x15  SAQ 4# 2x15  Standing row FT 7.5# 2x12   Standing FT stirs 7.5# x10 R/L  Rec Bike L5 - 6 min  Tball DKTC (no bouncing) 2x20  Tball bridge ball 2x15  STS without UE 2x10  SKTC stretch 2x30\" R/L  Open book x12 R/L  DL bridge x12  HL Blue Tband clam x20  DL bridge with blue band around knees x12  Shuttle press 87.5-150# x 12 ea DBL, SL 62.5# 2x12 L  Hurdles F/L 6in  Pirif stretching with opposite bent knee    MANUAL:  Knee ext mobilizations, patellar mobs    Assessment:     Pt tolerated increased resistance with leg curl today, no complaints of any increased pain. Pt was able to complete foam step taps without " UE support, no LOB. Pt issued additional HEP for home, TB SS, TB monster walk, and SLR.     Plan:    Continue general core/LE strength and mobility.

## 2023-10-28 DIAGNOSIS — I10 ESSENTIAL HYPERTENSION: Primary | ICD-10-CM

## 2023-10-30 RX ORDER — CYANOCOBALAMIN 1000 UG/ML
INJECTION, SOLUTION INTRAMUSCULAR; SUBCUTANEOUS
Qty: 4 ML | Refills: 7 | Status: SHIPPED | OUTPATIENT
Start: 2023-10-30

## 2023-10-31 ENCOUNTER — TREATMENT (OUTPATIENT)
Dept: PHYSICAL THERAPY | Facility: CLINIC | Age: 88
End: 2023-10-31
Payer: MEDICARE

## 2023-10-31 DIAGNOSIS — G89.29 CHRONIC PAIN OF LEFT KNEE: Primary | ICD-10-CM

## 2023-10-31 DIAGNOSIS — M25.562 CHRONIC PAIN OF LEFT KNEE: Primary | ICD-10-CM

## 2023-10-31 DIAGNOSIS — Z96.652 PRESENCE OF LEFT ARTIFICIAL KNEE JOINT: ICD-10-CM

## 2023-10-31 DIAGNOSIS — M25.552 LEFT HIP PAIN: ICD-10-CM

## 2023-10-31 PROCEDURE — 97110 THERAPEUTIC EXERCISES: CPT | Mod: GP,CQ,KX

## 2023-10-31 NOTE — PROGRESS NOTES
"Physical Therapy Treatment    Patient Name: Buck Leiva  MRN: [id number]  Today's Date: 10/31/2023  Visit 8 of 10  Time Calculation  Start Time: 1203  Stop Time: 1244  Time Calculation (min): 41 min  PT Therapeutic Procedures Time Entry  Therapeutic Exercise Time Entry: 38   Current Problem   1. Chronic pain of left knee        2. Left hip pain  Follow Up In Physical Therapy      3. Presence of left artificial knee joint          Precautions:  L TKA on 11/16/22, recent fall leading up to therapy. Patient is forgetful and requires frequent verbal and tactile cues for proper exercise technique and sequencing with activities.    Subjective   General    Pt states he has no pain or soreness today, nothing more than his usual age related soreness. Pt reports he does his HEP everyday, wondering if it is ok for him to take a day off every couple days.     Pre-treatment Pain:    0/10    Objective   Findings: Lumbar hypomobility and muscle imbalances, L hip weakness and L knee stiffness s/p TKA (11/16/22), impaired gait and balance. R trunk lean with standing and ambulation. Cane on R.    Treatments:  Therapeutic Exercise:  Nustep L5 6 min  Lean on counter hip extension- no resitance x10 , yellow x10 R/L  Shuttle press # x12 ,150# x10 , SL 62.5# 2x12 R/L  HR/TR's 2x15 ea  Modified SLS airex and 7\"  Hurdles //bars 6\"- step to and reciprocal (min UE)   STS without UE x10  Standing row purple 2x15    DNP:  HSC 50# 2x10 , 55-60# 2x10  Leg press 30-40-50# 3x12  7in step ups 2x12 with UE fwd  Foam 5\" step taps (light to no UE)  Tband sidesteps and fw monsters - orange 2 laps ea  Active SLR 2x10 (ext lag)  LTR x20  PPT x15  SAQ 4# 2x15  Standing FT stirs 7.5# x10 R/L  Rec Bike L5 - 6 min  Tball DKTC (no bouncing) 2x20  Tball bridge ball 2x15  SKTC stretch 2x30\" R/L  Open book x12 R/L  DL bridge x12  HL Blue Tband clam x20  DL bridge with blue band around knees x12  Pirif stretching with opposite bent knee    MANUAL: DNP  Knee " ext mobilizations, patellar mobs    Assessment:     Pt did well with all exercises today, no complaints of pain in L hip or knee. Pt encouraged to take breaks from exercises at home if feeling tired.     Post Paint:   0/10    Plan:    Continue general core/LE strength and mobility.

## 2023-11-03 ENCOUNTER — TREATMENT (OUTPATIENT)
Dept: PHYSICAL THERAPY | Facility: CLINIC | Age: 88
End: 2023-11-03
Payer: MEDICARE

## 2023-11-03 DIAGNOSIS — G89.29 CHRONIC PAIN OF LEFT KNEE: Primary | ICD-10-CM

## 2023-11-03 DIAGNOSIS — Z96.652 PRESENCE OF LEFT ARTIFICIAL KNEE JOINT: ICD-10-CM

## 2023-11-03 DIAGNOSIS — M25.562 CHRONIC PAIN OF LEFT KNEE: Primary | ICD-10-CM

## 2023-11-03 DIAGNOSIS — M25.552 LEFT HIP PAIN: ICD-10-CM

## 2023-11-03 PROCEDURE — 97110 THERAPEUTIC EXERCISES: CPT | Mod: GP,CQ,KX

## 2023-11-03 ASSESSMENT — PAIN - FUNCTIONAL ASSESSMENT: PAIN_FUNCTIONAL_ASSESSMENT: 0-10

## 2023-11-03 ASSESSMENT — PAIN SCALES - GENERAL: PAINLEVEL_OUTOF10: 0 - NO PAIN

## 2023-11-03 NOTE — PROGRESS NOTES
"Physical Therapy Treatment    Patient Name: Buck Leiva  MRN: [id number]  Today's Date: 11/3/2023  Visit 8 of 10  Time Calculation  Start Time: 1115  Stop Time: 1200  Time Calculation (min): 45 min  PT Therapeutic Procedures Time Entry  Therapeutic Exercise Time Entry: 45   Current Problem   1. Chronic pain of left knee        2. Left hip pain  Follow Up In Physical Therapy      3. Presence of left artificial knee joint            Precautions:  L TKA on 11/16/22, recent fall leading up to therapy. Patient is forgetful and requires frequent verbal and tactile cues for proper exercise technique and sequencing with activities.    Subjective   General   General Comment: Doing well today. nothing new to report.  Does not use cane at homePt states he has no pain or soreness today, nothing more than his usual age related soreness. Pt reports he does his HEP everyday, wondering if it is ok for him to take a day off every couple days.     Pre-treatment Pain:   Pain Assessment: 0-10  Pain Score: 0 - No pain0/10   Just has stiffness    Objective   Findings: Lumbar hypomobility and muscle imbalances, L hip weakness and L knee stiffness s/p TKA (11/16/22), impaired gait and balance. R trunk lean with standing and ambulation. Cane on R.    Treatments:  Therapeutic Exercise:  Nustep L5 6 min  Lean on counter hip extension- no resitance x10 , yellow x10 R/L  Shuttle press # x12 ,150# x10 , SL 62.5# 2x12 R/L  HSC 50# 2x10 , 55-60# 3x10  SAQ 5# 2x15  HR/TR's 2x15 ea  Modified SLS airex and 7\"  Hurdles //bars 6\"- step to and reciprocal (min UE)   STS without UE x10  Standing row purple 2x15  Resisted gait 10# fwd bkwd 5x each  Stepping stones  Walking in cardiac rehab room 4 min to work on step length and gait fluidity with cane    DNP:    Leg press 30-40-50# 3x12  7in step ups 2x12 with UE fwd  Foam 5\" step taps (light to no UE)  Tband sidesteps and fw monsters - orange 2 laps ea  Active SLR 2x10 (ext lag)  LTR x20  PPT " "x15  Standing FT stirs 7.5# x10 R/L  Rec Bike L5 - 6 min  Tball DKTC (no bouncing) 2x20  Tball bridge ball 2x15  SKTC stretch 2x30\" R/L  Open book x12 R/L  DL bridge x12  HL Blue Tband clam x20  DL bridge with blue band around knees x12  Pirif stretching with opposite bent knee    Assessment:   PT Assessment  Assessment Comment: Good effort with session. Cues to increase step length for more fluid gait Pt did well with all exercises today, no complaints of pain in L hip or knee. Pt encouraged to take breaks from exercises at home if feeling tired. Cues to go slow needed with certain therex    Post Paint:   0/10    Plan:   OP PT Plan  PT Plan: Skilled PTContinue general core/LE strength and mobility.      "

## 2023-11-06 ENCOUNTER — TREATMENT (OUTPATIENT)
Dept: PHYSICAL THERAPY | Facility: CLINIC | Age: 88
End: 2023-11-06
Payer: MEDICARE

## 2023-11-06 DIAGNOSIS — M25.562 CHRONIC PAIN OF LEFT KNEE: Primary | ICD-10-CM

## 2023-11-06 DIAGNOSIS — M25.552 LEFT HIP PAIN: ICD-10-CM

## 2023-11-06 DIAGNOSIS — G89.29 CHRONIC PAIN OF LEFT KNEE: Primary | ICD-10-CM

## 2023-11-06 DIAGNOSIS — Z96.652 PRESENCE OF LEFT ARTIFICIAL KNEE JOINT: ICD-10-CM

## 2023-11-06 PROCEDURE — 97110 THERAPEUTIC EXERCISES: CPT | Mod: GP,CQ,KX

## 2023-11-06 NOTE — PROGRESS NOTES
"Physical Therapy Treatment Progress Note    Patient Name: Buck Leiva  MRN: [id number]  Today's Date: 11/6/2023  Visit 10 of 18  Time Calculation  Start Time: 1415  Stop Time: 1459  Time Calculation (min): 44 min  PT Therapeutic Procedures Time Entry  Therapeutic Exercise Time Entry: 38   Current Problem   1. Chronic pain of left knee        2. Left hip pain  Follow Up In Physical Therapy      3. Presence of left artificial knee joint          Precautions:  L TKA on 11/16/22, recent fall leading up to therapy. Patient is forgetful and requires frequent verbal and tactile cues for proper exercise technique and sequencing with activities.    Subjective   General    Pt states the L knee and low back are feeling stiff today, stairs seemed to cause the knee to stiffen up more than usual. Pt feels that he has improved since starting therapy, not feeling as stiff and is feeling stronger in LE's.    Pre-treatment Pain:    Very stiff low back and L knee    Objective   ROM   Knee: Left: Flexion: AROM 130, Extension: AROM 2      Outcome Measures   Low Back Disability / Oswestry: 42% (was 48%)      Findings: Lumbar hypomobility and muscle imbalances, L hip weakness and L knee stiffness s/p TKA (11/16/22), impaired gait and balance. R trunk lean with standing and ambulation. Cane on R.    Treatments:  Therapeutic Exercise:  Nustep L5 6 min  HSC 55-60-65# 3x10  Shuttle press -150# 2x12 , SL 62.5# 2x10 R/L  LTR x20  SKTC stretch 4x10\" R/L  SAQ 5# 2x15 R/L  Quad sets and heel slides (before ROM measurements)    DNP:  Lean on counter hip extension- no resitance x10 , yellow x10 R/L  HR/TR's 2x15 ea  Modified SLS airex and 7\"  Hurdles //bars 6\"- step to and reciprocal (min UE)   STS without UE x10  Standing row purple 2x15  Resisted gait 10# fwd bkwd 5x each  Stepping stones  Walking in cardiac rehab room 4 min to work on step length and gait fluidity with cane  Leg press 30-40-50# 3x12  7in step ups 2x12 with UE fwd  Foam 5\" " step taps (light to no UE)  Tband sidesteps and fw monsters - orange 2 laps ea  Active SLR 2x10 (ext lag)  PPT x15  Standing FT stirs 7.5# x10 R/L  Rec Bike L5 - 6 min  Tball DKTC (no bouncing) 2x20  Tball bridge ball 2x15  Open book x12 R/L  DL bridge x12  HL Blue Tband clam x20  DL bridge with blue band around knees x12  Pirif stretching with opposite bent knee    Assessment:     Pt had a slight 6% improvement on LBDQ from initial evaluation. Pt has improved L knee ROM, just short of full extension and WFL for flexion. Pt will continue to benefit from skilled PT to reduced pain/stiffness in lumbar spine and L knee.     Post Pain:   Much looser    Plan:    Patient would benefit from continued PT 1-2x/week for up to 18 total visits to address remaining observed core/LE strength and mobility deficits to improve safety and efficiency of movement and ambulation.      Goals:   Active       PT Problem       STG's (Met)       Start:  09/30/23    Expected End:  10/14/23    Resolved:  11/07/23    - Independent with HEP.         LTG's (Progressing)       Start:  09/30/23    Expected End:  12/29/23       1) Pt will score less than 10% impairment on Low Back Disability Questionnaire to indicate significant improvement in low back function.  2) Pt will improve bilateral hip strength to at least 4+/5 for all major muscle groups for improved functional strength with general mobility, walking, and stairs  3) Pt will display symmetrical pain-free gait without AD and without compensation.  4) Pt will display L knee ROM of at least 0-120 for less difficulty walking and with general mobility.

## 2023-11-09 ENCOUNTER — TREATMENT (OUTPATIENT)
Dept: PHYSICAL THERAPY | Facility: CLINIC | Age: 88
End: 2023-11-09
Payer: MEDICARE

## 2023-11-09 DIAGNOSIS — G89.29 CHRONIC PAIN OF LEFT KNEE: Primary | ICD-10-CM

## 2023-11-09 DIAGNOSIS — Z96.652 PRESENCE OF LEFT ARTIFICIAL KNEE JOINT: ICD-10-CM

## 2023-11-09 DIAGNOSIS — M25.552 LEFT HIP PAIN: ICD-10-CM

## 2023-11-09 DIAGNOSIS — M25.562 CHRONIC PAIN OF LEFT KNEE: Primary | ICD-10-CM

## 2023-11-09 PROCEDURE — 97110 THERAPEUTIC EXERCISES: CPT | Mod: GP,KX

## 2023-11-09 NOTE — PROGRESS NOTES
"Physical Therapy Treatment Progress Note    Patient Name: Buck Leiva  MRN: [id number]  Today's Date: 11/9/2023  Visit 11 of 18  Time Calculation  Start Time: 1345  Stop Time: 1430  Time Calculation (min): 45 min  PT Therapeutic Procedures Time Entry  Therapeutic Exercise Time Entry: 41     Current Problem   1. Chronic pain of left knee  Follow Up In Physical Therapy      2. Left hip pain  Follow Up In Physical Therapy    Follow Up In Physical Therapy      3. Presence of left artificial knee joint  Follow Up In Physical Therapy        Precautions:  L TKA on 11/16/22, recent fall leading up to therapy. Patient is forgetful and requires frequent verbal and tactile cues for proper exercise technique and sequencing with activities.    Subjective   General    Doing okay today. Seems to be walking better in general, and usually walks around the house without his cane.    Pre-treatment Pain:    Very stiff low back and L knee    Objective   ROM   Knee: Left: Flexion: AROM 130, Extension: AROM 2      Outcome Measures   Low Back Disability / Oswestry: 42% (was 48%)      Findings: Lumbar hypomobility and muscle imbalances, L hip weakness and L knee stiffness s/p TKA (11/16/22), impaired gait and balance. R trunk lean with standing and ambulation. Cane on R.    Treatments:  Therapeutic Exercise:  Nustep L5 6 min  Lean on counter hip extension- yellow 2x10 R/L  YTBSS 3 laps   HSC 60-65-70# 3x10  Shuttle press -150# 2x12, 175# x8 , SL 62.5# 2x10 R/L  Resisted gait 10# fwd bkwd 5x each  HR/TR's 2x15 ea  5\" fw/lat step ups R/L 2x10  5\" step taps firm fw/lat (no UE)    DNP:  LTR x20  SKTC stretch 4x10\" R/L  SAQ 5# 2x15 R/L  Quad sets and heel slides (before ROM measurements)  Modified SLS airex and 7\"  Hurdles //bars 6\"- step to and reciprocal (min UE)   STS without UE x10  Standing row purple 2x15  Stepping stones  Walking in cardiac rehab room 4 min to work on step length and gait fluidity with cane  Leg press 30-40-50# " 3x12  Active SLR 2x10 (ext lag)  PPT x15  Standing FT stirs 7.5# x10 R/L  Rec Bike L5 - 6 min  Tball bridge ball 2x15  Open book x12 R/L  DL bridge x12  HL Blue Tband clam x20  DL bridge with blue band around knees x12  Pirif stretching with opposite bent knee    Assessment:     Continued functional L glute and quad weakness limiting overall function, leading to gait and mobility impairments and asymmetry. Progress as tolerated.     Post Pain:   Much looser    Plan:    Patient would benefit from continued PT 1-2x/week for up to 18 total visits to address remaining observed core/LE strength and mobility deficits to improve safety and efficiency of movement and ambulation.      Goals:   Active       PT Problem       STG's (Met)       Start:  09/30/23    Expected End:  10/14/23    Resolved:  11/07/23    - Independent with HEP.         LTG's (Progressing)       Start:  09/30/23    Expected End:  12/29/23       1) Pt will score less than 10% impairment on Low Back Disability Questionnaire to indicate significant improvement in low back function.  2) Pt will improve bilateral hip strength to at least 4+/5 for all major muscle groups for improved functional strength with general mobility, walking, and stairs  3) Pt will display symmetrical pain-free gait without AD and without compensation.  4) Pt will display L knee ROM of at least 0-120 for less difficulty walking and with general mobility.

## 2023-11-10 DIAGNOSIS — I25.10 ATHEROSCLEROTIC HEART DISEASE OF NATIVE CORONARY ARTERY WITHOUT ANGINA PECTORIS: ICD-10-CM

## 2023-11-11 RX ORDER — AMLODIPINE BESYLATE 5 MG/1
5 TABLET ORAL DAILY
Qty: 90 TABLET | Refills: 3 | Status: SHIPPED | OUTPATIENT
Start: 2023-11-11 | End: 2024-11-10

## 2023-11-15 ENCOUNTER — PATIENT OUTREACH (OUTPATIENT)
Dept: PRIMARY CARE | Facility: CLINIC | Age: 88
End: 2023-11-15

## 2023-11-15 ENCOUNTER — OFFICE VISIT (OUTPATIENT)
Dept: ORTHOPEDIC SURGERY | Facility: CLINIC | Age: 88
End: 2023-11-15
Payer: MEDICARE

## 2023-11-15 DIAGNOSIS — C18.7 CANCER OF SIGMOID COLON (MULTI): ICD-10-CM

## 2023-11-15 DIAGNOSIS — I25.119 CORONARY ARTERY DISEASE WITH ANGINA PECTORIS, UNSPECIFIED VESSEL OR LESION TYPE, UNSPECIFIED WHETHER NATIVE OR TRANSPLANTED HEART (CMS-HCC): ICD-10-CM

## 2023-11-15 DIAGNOSIS — E78.5 HYPERLIPIDEMIA, UNSPECIFIED HYPERLIPIDEMIA TYPE: ICD-10-CM

## 2023-11-15 DIAGNOSIS — I10 HYPERTENSION, UNSPECIFIED TYPE: ICD-10-CM

## 2023-11-15 DIAGNOSIS — J44.9 CHRONIC OBSTRUCTIVE PULMONARY DISEASE, UNSPECIFIED COPD TYPE (MULTI): ICD-10-CM

## 2023-11-15 DIAGNOSIS — Z96.652 STATUS POST TOTAL KNEE REPLACEMENT, LEFT: ICD-10-CM

## 2023-11-15 DIAGNOSIS — M75.42 IMPINGEMENT SYNDROME, SHOULDER, LEFT: Primary | ICD-10-CM

## 2023-11-15 PROCEDURE — 1159F MED LIST DOCD IN RCRD: CPT | Performed by: SURGERY

## 2023-11-15 PROCEDURE — 99490 CHRNC CARE MGMT STAFF 1ST 20: CPT | Performed by: FAMILY MEDICINE

## 2023-11-15 PROCEDURE — 1126F AMNT PAIN NOTED NONE PRSNT: CPT | Performed by: SURGERY

## 2023-11-15 PROCEDURE — 99213 OFFICE O/P EST LOW 20 MIN: CPT | Performed by: SURGERY

## 2023-11-15 PROCEDURE — 1160F RVW MEDS BY RX/DR IN RCRD: CPT | Performed by: SURGERY

## 2023-11-15 PROCEDURE — 1036F TOBACCO NON-USER: CPT | Performed by: SURGERY

## 2023-11-15 NOTE — PROGRESS NOTES
Spoke with patient wife Cheyenne who states he is doing well. He has 1 year follow up appt today for his knee surgery. Patient is currently active with PT and is doing well, also does exercises on his own. She states when weather permits he will walk outside with assistance and stop when he tires out. She states no recent falls, compliant with medications. No questions or concerns at this time and agreeable to follow up calls.

## 2023-11-17 RX ORDER — SIMVASTATIN 20 MG/1
TABLET, FILM COATED ORAL
Qty: 90 TABLET | Refills: 4 | Status: SHIPPED | OUTPATIENT
Start: 2023-11-17

## 2023-11-22 ENCOUNTER — TREATMENT (OUTPATIENT)
Dept: PHYSICAL THERAPY | Facility: CLINIC | Age: 88
End: 2023-11-22
Payer: MEDICARE

## 2023-11-22 DIAGNOSIS — M25.552 LEFT HIP PAIN: ICD-10-CM

## 2023-11-22 DIAGNOSIS — Z96.652 PRESENCE OF LEFT ARTIFICIAL KNEE JOINT: ICD-10-CM

## 2023-11-22 DIAGNOSIS — M25.562 CHRONIC PAIN OF LEFT KNEE: ICD-10-CM

## 2023-11-22 DIAGNOSIS — G89.29 CHRONIC PAIN OF LEFT KNEE: ICD-10-CM

## 2023-11-22 PROCEDURE — 97110 THERAPEUTIC EXERCISES: CPT | Mod: KX,GP | Performed by: PHYSICAL THERAPIST

## 2023-11-22 NOTE — PROGRESS NOTES
"Physical Therapy Treatment Progress Note    Patient Name: Buck Leiva  MRN: [id number]  Today's Date: 11/22/2023  Visit 12 of 18  Time Calculation  Start Time: 0212  Stop Time: 0251  Time Calculation (min): 39 min  PT Therapeutic Procedures Time Entry  Therapeutic Exercise Time Entry: 40     Current Problem   1. Left hip pain  Follow Up In Physical Therapy      2. Chronic pain of left knee  Follow Up In Physical Therapy      3. Presence of left artificial knee joint  Follow Up In Physical Therapy        Precautions:  L TKA on 11/16/22, recent fall leading up to therapy. Patient is forgetful and requires frequent verbal and tactile cues for proper exercise technique and sequencing with activities.    Subjective   General    He has been waling with his staff, outside, and remarked that he is stronger and able to walking with less pain, and is more steady.     Pre-treatment Pain:    Very stiff low back and L knee    Objective   ROM   Knee: Left: Flexion: AROM 130, Extension: AROM 2      Outcome Measures   Low Back Disability / Oswestry: 42% (was 48%)      Findings: Lumbar hypomobility and muscle imbalances, L hip weakness and L knee stiffness s/p TKA (11/16/22), impaired gait and balance. R trunk lean with standing and ambulation. Cane on R.    Treatments:  Therapeutic Exercise:  Nustep L5 6 min    Resisted gait 10# fwd bkwd 5x each  Lean on counter hip extension- yellow 3x10 R/L  YTBSS 3 laps     HR/TR's 2x15 ea  5\" fw/lat step ups R/L 2x10  5\" step taps firm fw/lat (no UE)    Shuttle press -175# 3 x 12, SL 75# 2x10 R/L32  HSC 70# 3x10          DNP:  LTR x20  SKTC stretch 4x10\" R/L  SAQ 5# 2x15 R/L  Quad sets and heel slides (before ROM measurements)  Modified SLS airex and 7\"  Hurdles //bars 6\"- step to and reciprocal (min UE)   STS without UE x10  Standing row purple 2x15  Stepping stones  Walking in cardiac rehab room 4 min to work on step length and gait fluidity with cane  Leg press 30-40-50# 3x12  Active " SLR 2x10 (ext lag)  PPT x15  Standing FT stirs 7.5# x10 R/L  Rec Bike L5 - 6 min  Tball bridge ball 2x15  Open book x12 R/L  DL bridge x12  HL Blue Tband clam x20  DL bridge with blue band around knees x12  Pirif stretching with opposite bent knee    Assessment:     Continued functional L glute and quad weakness limiting overall function, leading to gait and mobility impairments and asymmetry. Progress as tolerated.     Post Pain:   Much looser    Plan:    Patient would benefit from continued PT 1-2x/week for up to 18 total visits to address remaining observed core/LE strength and mobility deficits to improve safety and efficiency of movement and ambulation.      Goals:   Active       PT Problem       STG's (Met)       Start:  09/30/23    Expected End:  10/14/23    Resolved:  11/07/23    - Independent with HEP.         LTG's (Progressing)       Start:  09/30/23    Expected End:  12/29/23       1) Pt will score less than 10% impairment on Low Back Disability Questionnaire to indicate significant improvement in low back function.  2) Pt will improve bilateral hip strength to at least 4+/5 for all major muscle groups for improved functional strength with general mobility, walking, and stairs  3) Pt will display symmetrical pain-free gait without AD and without compensation.  4) Pt will display L knee ROM of at least 0-120 for less difficulty walking and with general mobility.

## 2023-11-30 ENCOUNTER — TREATMENT (OUTPATIENT)
Dept: PHYSICAL THERAPY | Facility: CLINIC | Age: 88
End: 2023-11-30
Payer: MEDICARE

## 2023-11-30 DIAGNOSIS — Z96.652 PRESENCE OF LEFT ARTIFICIAL KNEE JOINT: ICD-10-CM

## 2023-11-30 DIAGNOSIS — M25.552 LEFT HIP PAIN: ICD-10-CM

## 2023-11-30 DIAGNOSIS — G89.29 CHRONIC PAIN OF LEFT KNEE: ICD-10-CM

## 2023-11-30 DIAGNOSIS — M25.562 CHRONIC PAIN OF LEFT KNEE: ICD-10-CM

## 2023-11-30 PROCEDURE — 97110 THERAPEUTIC EXERCISES: CPT | Mod: GP,CQ,KX

## 2023-11-30 ASSESSMENT — PAIN SCALES - GENERAL: PAINLEVEL_OUTOF10: 0 - NO PAIN

## 2023-11-30 ASSESSMENT — PAIN - FUNCTIONAL ASSESSMENT: PAIN_FUNCTIONAL_ASSESSMENT: 0-10

## 2023-11-30 NOTE — PROGRESS NOTES
"Physical Therapy Treatment Progress Note    Patient Name: Buck Leiva  MRN: [id number]  Today's Date: 11/30/2023  Visit 13 of 18  Time Calculation  Start Time: 0210  Stop Time: 0255  Time Calculation (min): 45 min  PT Therapeutic Procedures Time Entry  Therapeutic Exercise Time Entry: 45     Current Problem   1. Left hip pain  Follow Up In Physical Therapy      2. Chronic pain of left knee  Follow Up In Physical Therapy      3. Presence of left artificial knee joint  Follow Up In Physical Therapy          Precautions:  L TKA on 11/16/22, recent fall leading up to therapy. Patient is forgetful and requires frequent verbal and tactile cues for proper exercise technique and sequencing with activities.    Subjective   General   Feeling sore today may have overdone therex yesterday    Pre-treatment Pain:   Pain Assessment: 0-10  Pain Score: 0 - No pain  Response to Interventions: just tiredVery stiff low back and L knee    Objective   ROM   Knee: Left: Flexion: AROM 130, Extension: AROM 2      Outcome Measures   Low Back Disability / Oswestry: 42% (was 48%)      Findings: Lumbar hypomobility and muscle imbalances, L hip weakness and L knee stiffness s/p TKA (11/16/22), impaired gait and balance. R trunk lean with standing and ambulation. Cane on R.    Treatments:  Therapeutic Exercise:  Nustep L5 6 min    Resisted gait 10# fwd bkwd 5x each  Lean on counter hip extension- yellow 3x10 R/L  YTBSS 3 laps     HR/TR's 2x15 ea  5\" fw/lat step ups R/L 2x10  5\" step taps firm fw/lat (no UE) challenged  STS 10x  STS staggerd 5x each    Shuttle press -175# 3 x 12, SL 75# 2x 15 R/L  HSC 70# 3x10       DNP:  LTR x20  SKTC stretch 4x10\" R/L  SAQ 5# 2x15 R/L  Quad sets and heel slides (before ROM measurements)  Modified SLS airex and 7\"  Hurdles //bars 6\"- step to and reciprocal (min UE)   STS without UE x10  Standing row purple 2x15  Stepping stones  Walking in cardiac rehab room 4 min to work on step length and gait fluidity " with cane  Leg press 30-40-50# 3x12  Active SLR 2x10 (ext lag)  PPT x15  Standing FT stirs 7.5# x10 R/L  Rec Bike L5 - 6 min  Tball bridge ball 2x15  Open book x12 R/L  DL bridge x12  HL Blue Tband clam x20  DL bridge with blue band around knees x12  Pirif stretching with opposite bent knee    Assessment:     Cues to slow down with certain therex. Patient does give good effort but has some balance challenges at time  Post Pain:   Much looser    Plan:    Patient would benefit from continued PT 1-2x/week for up to 18 total visits to address remaining observed core/LE strength and mobility deficits to improve safety and efficiency of movement and ambulation.      Goals:   Active       PT Problem       STG's (Met)       Start:  09/30/23    Expected End:  10/14/23    Resolved:  11/07/23    - Independent with HEP.         LTG's (Progressing)       Start:  09/30/23    Expected End:  12/29/23       1) Pt will score less than 10% impairment on Low Back Disability Questionnaire to indicate significant improvement in low back function.  2) Pt will improve bilateral hip strength to at least 4+/5 for all major muscle groups for improved functional strength with general mobility, walking, and stairs  3) Pt will display symmetrical pain-free gait without AD and without compensation.  4) Pt will display L knee ROM of at least 0-120 for less difficulty walking and with general mobility.

## 2023-12-06 ENCOUNTER — TREATMENT (OUTPATIENT)
Dept: PHYSICAL THERAPY | Facility: CLINIC | Age: 88
End: 2023-12-06
Payer: MEDICARE

## 2023-12-06 DIAGNOSIS — Z96.652 PRESENCE OF LEFT ARTIFICIAL KNEE JOINT: ICD-10-CM

## 2023-12-06 DIAGNOSIS — M25.562 CHRONIC PAIN OF LEFT KNEE: ICD-10-CM

## 2023-12-06 DIAGNOSIS — G89.29 CHRONIC PAIN OF LEFT KNEE: ICD-10-CM

## 2023-12-06 DIAGNOSIS — M25.552 LEFT HIP PAIN: ICD-10-CM

## 2023-12-06 PROCEDURE — 97110 THERAPEUTIC EXERCISES: CPT | Mod: GP,KX

## 2023-12-06 ASSESSMENT — PAIN - FUNCTIONAL ASSESSMENT: PAIN_FUNCTIONAL_ASSESSMENT: 0-10

## 2023-12-06 ASSESSMENT — PAIN SCALES - GENERAL: PAINLEVEL_OUTOF10: 0 - NO PAIN

## 2023-12-06 NOTE — PROGRESS NOTES
"Physical Therapy Treatment Progress Note    Patient Name: Buck Leiva  MRN: [id number]  PT Received On: 12/06/23  Time Calculation  Start Time: 1146  Stop Time: 1228  Time Calculation (min): 42 min  PT Therapeutic Procedures Time Entry  Therapeutic Exercise Time Entry: 40    Visit Number: 14  Visits/Dates Authorized: 18     Current Problem   1. Left hip pain  Follow Up In Physical Therapy      2. Chronic pain of left knee  Follow Up In Physical Therapy      3. Presence of left artificial knee joint  Follow Up In Physical Therapy        Precautions:  L TKA on 11/16/22, recent fall leading up to therapy. Patient is forgetful and requires frequent verbal and tactile cues for proper exercise technique and sequencing with activities.    Subjective   General   Feeling good today. Wife present throughout session, asking about proper technique and safety with home exercises.    Pre-treatment Pain:   Pain Assessment: 0-10  Pain Score: 0 - No pain  Response to Interventions: 0/10 fatigued    Objective   ROM   Knee: Left: Flexion: AROM 130, Extension: AROM 2      Outcome Measures   Low Back Disability / Oswestry: 42% (was 48%)      Findings: Lumbar hypomobility and muscle imbalances, L hip weakness and L knee stiffness s/p TKA (11/16/22), impaired gait and balance. R trunk lean with standing and ambulation. Cane/walking stick on R.    Treatments:  Therapeutic Exercise:  Nustep L5 6 min OR Rec Bike L5 - 6 min  6\" fw/lat step up 2x10 R/L (cues for L hip engagement and avoiding contralateral pelvic drop)  Modified SLS 6\" R/L  Squat chair taps no UE 2x8  Resisted gait 12.5# fwd, 10# bkwd 5x each  SL bridge 3x5  S/L clam with orange Tband 2x15 R/L  HEP    Deferred:  Lean on counter hip extension- yellow 3x10 R/L  YTBSS 3 laps   HR/TR's 2x15 ea  5\" step taps firm fw/lat (no UE) challenged  Shuttle press -175# 3 x 12, SL 75# 2x 15 R/L  HSC 70# 3x10   SKTC stretch 4x10\" R/L  SAQ 5# 2x15 R/L  Quad sets and heel slides (before ROM " "measurements)  Hurdles //bars 6\"- step to and reciprocal (min UE)   Standing row purple 2x15  Stepping stones  Walking in cardiac rehab room 4 min to work on step length and gait fluidity with cane  Leg press 30-40-50# 3x12  Active SLR 2x10 (ext lag)  Standing FT stirs 7.5# x10 R/L  Open book x12 R/L  Pirif stretching with opposite bent knee    Assessment:    Patient is impulsive and tends to perform exercises quickly and with decreased control. Cues for slow controlled movements. L>R hip strength deficits resulting in trendelenberg and contralateral pelvic drop with stairs. Updated clinic and home exercises to emphasize glute strengthening. Spouse was present throughout treatment in order to cue patient correctly at home.     Plan:   Patient would benefit from continued PT 1-2x/week for up to 18 total visits to address remaining observed core/LE strength and mobility deficits to improve safety and efficiency of movement and ambulation.      Goals:   Active       PT Problem       STG's (Met)       Start:  09/30/23    Expected End:  10/14/23    Resolved:  11/07/23    - Independent with HEP.         LTG's (Progressing)       Start:  09/30/23    Expected End:  12/29/23       1) Pt will score less than 10% impairment on Low Back Disability Questionnaire to indicate significant improvement in low back function.  2) Pt will improve bilateral hip strength to at least 4+/5 for all major muscle groups for improved functional strength with general mobility, walking, and stairs  3) Pt will display symmetrical pain-free gait without AD and without compensation.  4) Pt will display L knee ROM of at least 0-120 for less difficulty walking and with general mobility.           "

## 2023-12-08 ENCOUNTER — TREATMENT (OUTPATIENT)
Dept: PHYSICAL THERAPY | Facility: CLINIC | Age: 88
End: 2023-12-08
Payer: MEDICARE

## 2023-12-08 DIAGNOSIS — G89.29 CHRONIC PAIN OF LEFT KNEE: ICD-10-CM

## 2023-12-08 DIAGNOSIS — M25.552 LEFT HIP PAIN: ICD-10-CM

## 2023-12-08 DIAGNOSIS — M25.562 CHRONIC PAIN OF LEFT KNEE: ICD-10-CM

## 2023-12-08 DIAGNOSIS — Z96.652 PRESENCE OF LEFT ARTIFICIAL KNEE JOINT: ICD-10-CM

## 2023-12-08 PROCEDURE — 97110 THERAPEUTIC EXERCISES: CPT | Mod: GP,CQ,KX

## 2023-12-08 ASSESSMENT — PAIN SCALES - GENERAL: PAINLEVEL_OUTOF10: 0 - NO PAIN

## 2023-12-08 ASSESSMENT — PAIN - FUNCTIONAL ASSESSMENT: PAIN_FUNCTIONAL_ASSESSMENT: 0-10

## 2023-12-08 NOTE — PROGRESS NOTES
"Physical Therapy Treatment Progress Note    Patient Name: Buck Leiva  MRN: [id number]  PT Received On: 12/08/23  Time Calculation  Start Time: 0945  Stop Time: 1028  Time Calculation (min): 43 min  PT Therapeutic Procedures Time Entry  Therapeutic Exercise Time Entry: 40    Visit Number: 15  Visits/Dates Authorized: 18     Current Problem   1. Left hip pain  Follow Up In Physical Therapy      2. Chronic pain of left knee  Follow Up In Physical Therapy      3. Presence of left artificial knee joint  Follow Up In Physical Therapy          Precautions:  L TKA on 11/16/22, recent fall leading up to therapy. Patient is forgetful and requires frequent verbal and tactile cues for proper exercise technique and sequencing with activities.    Subjective   General   Pt states he is just feeling stiff in low back today. Pt reports he feels that the machine exercises are very helpful.     Pre-treatment Pain:   Pain Assessment: 0-10  Pain Score: 0 - No pain (stiff)  Response to Interventions: looser    Objective   ROM   Knee: Left: Flexion: AROM 130, Extension: AROM 2      Outcome Measures   Low Back Disability / Oswestry: 42% (was 48%)      Findings: Lumbar hypomobility and muscle imbalances, L hip weakness and L knee stiffness s/p TKA (11/16/22), impaired gait and balance. R trunk lean with standing and ambulation. Cane/walking stick on R.    Treatments:  Therapeutic Exercise:  Nustep L5 6 min OR Rec Bike L5 - 6 min  Resisted gait 12.5# fwd/bkwd 5x each  6\" fw/lat step up 2x10 R/L (cues for L hip engagement and avoiding contralateral pelvic drop)  Modified SLS 6\" R/L  Squat chair taps no UE 2x8  S/L clam with orange Tband 2x12 R/L  S/L reverse clam 2x12 R/L  SL bridge 2x5  Tball abdominal isometric x20  Tball DKTC x20  Active SLR 2x12 (ext lag on L)  Pot stir heavy orange cw/ccw 1x12 R/L    Deferred:  Lean on counter hip extension- yellow 3x10 R/L  YTBSS 3 laps   HR/TR's 2x15 ea  Shuttle press -175# 3 x 12, SL 75# 2x 15 " "R/L  HSC 70# 3x10   SKTC stretch 4x10\" R/L  SAQ 5# 2x15 R/L  Quad sets and heel slides (before ROM measurements)  Hurdles //bars 6\"- step to and reciprocal (min UE)   Standing row purple 2x15  Stepping stones  Walking in cardiac rehab room 4 min to work on step length and gait fluidity with cane  Leg press 30-40-50# 3x12  Open book x12 R/L  Pirif stretching with opposite bent knee    Assessment:    Pt is impulsive at time with exercises, needs verbal and tactile cues for completion of some exercises. Pt trialed hip IR exercises today, no pain but limited ROM R>L.     Plan:   Patient would benefit from continued PT 1-2x/week for up to 18 total visits to address remaining observed core/LE strength and mobility deficits to improve safety and efficiency of movement and ambulation.      Goals:   Active       PT Problem       STG's (Met)       Start:  09/30/23    Expected End:  10/14/23    Resolved:  11/07/23    - Independent with HEP.         LTG's (Progressing)       Start:  09/30/23    Expected End:  12/29/23       1) Pt will score less than 10% impairment on Low Back Disability Questionnaire to indicate significant improvement in low back function.  2) Pt will improve bilateral hip strength to at least 4+/5 for all major muscle groups for improved functional strength with general mobility, walking, and stairs  3) Pt will display symmetrical pain-free gait without AD and without compensation.  4) Pt will display L knee ROM of at least 0-120 for less difficulty walking and with general mobility.             "

## 2023-12-11 ENCOUNTER — PATIENT OUTREACH (OUTPATIENT)
Dept: PRIMARY CARE | Facility: CLINIC | Age: 88
End: 2023-12-11
Payer: MEDICARE

## 2023-12-11 ENCOUNTER — TREATMENT (OUTPATIENT)
Dept: PHYSICAL THERAPY | Facility: CLINIC | Age: 88
End: 2023-12-11
Payer: MEDICARE

## 2023-12-11 DIAGNOSIS — G89.29 CHRONIC PAIN OF LEFT KNEE: ICD-10-CM

## 2023-12-11 DIAGNOSIS — Z96.652 PRESENCE OF LEFT ARTIFICIAL KNEE JOINT: ICD-10-CM

## 2023-12-11 DIAGNOSIS — I25.119 CORONARY ARTERY DISEASE WITH ANGINA PECTORIS, UNSPECIFIED VESSEL OR LESION TYPE, UNSPECIFIED WHETHER NATIVE OR TRANSPLANTED HEART (CMS-HCC): ICD-10-CM

## 2023-12-11 DIAGNOSIS — M25.562 CHRONIC PAIN OF LEFT KNEE: ICD-10-CM

## 2023-12-11 DIAGNOSIS — M25.552 LEFT HIP PAIN: ICD-10-CM

## 2023-12-11 DIAGNOSIS — J44.9 CHRONIC OBSTRUCTIVE PULMONARY DISEASE, UNSPECIFIED COPD TYPE (MULTI): ICD-10-CM

## 2023-12-11 DIAGNOSIS — I10 HYPERTENSION, UNSPECIFIED TYPE: ICD-10-CM

## 2023-12-11 PROCEDURE — 99490 CHRNC CARE MGMT STAFF 1ST 20: CPT | Performed by: FAMILY MEDICINE

## 2023-12-11 PROCEDURE — 97110 THERAPEUTIC EXERCISES: CPT | Mod: GP,KX

## 2023-12-11 ASSESSMENT — PAIN - FUNCTIONAL ASSESSMENT: PAIN_FUNCTIONAL_ASSESSMENT: 0-10

## 2023-12-11 ASSESSMENT — PAIN SCALES - GENERAL: PAINLEVEL_OUTOF10: 0 - NO PAIN

## 2023-12-11 NOTE — PROGRESS NOTES
"Physical Therapy Treatment    Patient Name: Buck Leiva  MRN: [id number]  PT Received On: 12/11/23  Time Calculation  Start Time: 1148  Stop Time: 1230  Time Calculation (min): 42 min  PT Therapeutic Procedures Time Entry  Therapeutic Exercise Time Entry: 42    Visit Number: 16  Visits/Dates Authorized: 18    Current Problem   1. Left hip pain  Follow Up In Physical Therapy      2. Chronic pain of left knee  Follow Up In Physical Therapy      3. Presence of left artificial knee joint  Follow Up In Physical Therapy      Precautions:  L TKA on 11/16/22, recent fall leading up to therapy. Patient is forgetful and requires frequent verbal and tactile cues for proper exercise technique and sequencing with activities.    Subjective   General   Pt reports no back or LE pain today, just sore in the shoulders from a heavy band exercise over the weekend.     Pre-treatment Pain:   Pain Assessment: 0-10  Pain Score: 0 - No pain  Response to Interventions: No worse, just fatigued    Objective   ROM   Knee: Left: Flexion: AROM 130, Extension: AROM 2      Outcome Measures   Low Back Disability / Oswestry: 42% (was 48%)      Findings: Lumbar hypomobility and muscle imbalances, L hip weakness and L knee stiffness s/p TKA (11/16/22), impaired gait and balance. R trunk lean with standing and ambulation. Cane/walking stick on R.    Treatments:  Therapeutic Exercise:  Nustep L5 6 min OR Rec Bike L6 - 6 min  Resisted gait 12.5# fwd/bkwd 5x each  HSC 70# 3x10   Seated leg press 30-40# 3x12  7\" fw step up 2x10 R/L (cues for L hip engagement and avoiding contralateral pelvic drop)  Modified SLS 7\" R/L  7\" step taps foam fw/bw  Pot stir cable 7.5# cw/ccw x10 R/L  RB AP rocking     DNP  Squat chair taps no UE 2x8  S/L clam with orange Tband 2x12 R/L  S/L reverse clam 2x12 R/L  SL bridge 2x5  Tball abdominal isometric x20  Tball DKTC x20  Active SLR 2x12 (ext lag on L)  Lean on counter hip extension- yellow 3x10 R/L  YTBSS 3 laps   HR/TR's " "2x15 ea  Shuttle press -175# 3 x 12, SL 75# 2x 15 R/L  SKTC stretch 4x10\" R/L  SAQ 5# 2x15 R/L  Quad sets and heel slides (before ROM measurements)  Hurdles //bars 6\"- step to and reciprocal (min UE)   Standing row purple 2x15  Stepping stones  Open book x12 R/L  Pirif stretching with opposite bent knee    Assessment:    Pt is forgetful during session and needs verbal and tactile cues for completion of exercises with proper technique. Continued functional L hip weakness with difficulty engaging glutes with ipsilateral stance, even with cues.     Plan:   Patient would benefit from continued PT 1-2x/week for up to 18 total visits to address remaining observed core/LE strength and mobility deficits to improve safety and efficiency of movement and ambulation.      Goals:   Active       PT Problem       STG's (Met)       Start:  09/30/23    Expected End:  10/14/23    Resolved:  11/07/23    - Independent with HEP.         LTG's (Progressing)       Start:  09/30/23    Expected End:  12/29/23       1) Pt will score less than 10% impairment on Low Back Disability Questionnaire to indicate significant improvement in low back function.  2) Pt will improve bilateral hip strength to at least 4+/5 for all major muscle groups for improved functional strength with general mobility, walking, and stairs  3) Pt will display symmetrical pain-free gait without AD and without compensation.  4) Pt will display L knee ROM of at least 0-120 for less difficulty walking and with general mobility.           "

## 2023-12-11 NOTE — PROGRESS NOTES
Patient continues with PT for Left hip pain. He continues to do exercises on his own also. He walks outside when weather permits. No recent falls. Agreeable to follow up calls.

## 2023-12-14 ENCOUNTER — TREATMENT (OUTPATIENT)
Dept: PHYSICAL THERAPY | Facility: CLINIC | Age: 88
End: 2023-12-14
Payer: MEDICARE

## 2023-12-14 DIAGNOSIS — M25.552 LEFT HIP PAIN: ICD-10-CM

## 2023-12-14 DIAGNOSIS — G89.29 CHRONIC PAIN OF LEFT KNEE: ICD-10-CM

## 2023-12-14 DIAGNOSIS — M25.562 CHRONIC PAIN OF LEFT KNEE: ICD-10-CM

## 2023-12-14 DIAGNOSIS — Z96.652 PRESENCE OF LEFT ARTIFICIAL KNEE JOINT: ICD-10-CM

## 2023-12-14 PROCEDURE — 97110 THERAPEUTIC EXERCISES: CPT | Mod: GP,CQ,KX

## 2023-12-14 ASSESSMENT — PAIN SCALES - GENERAL: PAINLEVEL_OUTOF10: 0 - NO PAIN

## 2023-12-14 ASSESSMENT — PAIN - FUNCTIONAL ASSESSMENT: PAIN_FUNCTIONAL_ASSESSMENT: 0-10

## 2023-12-14 NOTE — PROGRESS NOTES
"Physical Therapy Treatment    Patient Name: Buck Leiva  MRN: [id number]  PT Received On: 12/14/23  Time Calculation  Start Time: 1215  Stop Time: 1255  Time Calculation (min): 40 min  PT Therapeutic Procedures Time Entry  Therapeutic Exercise Time Entry: 40  Visit Number: 17  Visits/Dates Authorized: 18    Current Problem   1. Left hip pain  Follow Up In Physical Therapy      2. Chronic pain of left knee  Follow Up In Physical Therapy      3. Presence of left artificial knee joint  Follow Up In Physical Therapy        Precautions:  L TKA on 11/16/22, recent fall leading up to therapy. Patient is forgetful and requires frequent verbal and tactile cues for proper exercise technique and sequencing with activities.    Subjective   General   Patient reports no pain just stiffness. Feels pretty good toda6y     Pre-treatment Pain:   Pain Assessment: 0-10  Pain Score: 0 - No pain  Response to Interventions: no pain just tired    Objective   ROM   Knee: Left: Flexion: AROM 130, Extension: AROM 2      Outcome Measures   Low Back Disability / Oswestry: 42% (was 48%)      Findings: Lumbar hypomobility and muscle imbalances, L hip weakness and L knee stiffness s/p TKA (11/16/22), impaired gait and balance. R trunk lean with standing and ambulation. Cane/walking stick on R.    Treatments:  Therapeutic Exercise:  Nustep L5 6 min OR Rec Bike L6 - 6 min  Resisted gait 12.5# fwd/bkwd 5x each  HSC 70# 3x10   Seated leg press 30-40# 3x12  7\" fw step up 2x10 R/L (cues for L hip engagement and avoiding contralateral pelvic drop)  Modified SLS 7\" R/L  7\" step taps foam fw/bw  Pot stir cable 7.5# cw/ccw x10 R/L  RB AP rocking    Side step with band  Stepping stones  STS no UE  Wgtd carry #8  Heel raises 20x  Hip extension over counter      DNP  Squat chair taps no UE 2x8  S/L clam with orange Tband 2x12 R/L  S/L reverse clam 2x12 R/L  SL bridge 2x5  Tball abdominal isometric x20  Tball DKTC x20  Active SLR 2x12 (ext lag on L)  Lean on " "counter hip extension- yellow 3x10 R/L  YTBSS 3 laps   HR/TR's 2x15 ea  Shuttle press -175# 3 x 12, SL 75# 2x 15 R/L  SKTC stretch 4x10\" R/L  SAQ 5# 2x15 R/L  Quad sets and heel slides (before ROM measurements)  Hurdles //bars 6\"- step to and reciprocal (min UE)   Standing row purple 2x15  Stepping stones  Open book x12 R/L  Pirif stretching with opposite bent knee    Assessment:   Good effort with session. NO pain during or after session. Legs get tired as noted with STS after 8 reps or so  Plan:   Cotn balance and strength     Goals:   Active       PT Problem       STG's (Met)       Start:  09/30/23    Expected End:  10/14/23    Resolved:  11/07/23    - Independent with HEP.         LTG's (Progressing)       Start:  09/30/23    Expected End:  12/29/23       1) Pt will score less than 10% impairment on Low Back Disability Questionnaire to indicate significant improvement in low back function.  2) Pt will improve bilateral hip strength to at least 4+/5 for all major muscle groups for improved functional strength with general mobility, walking, and stairs  3) Pt will display symmetrical pain-free gait without AD and without compensation.  4) Pt will display L knee ROM of at least 0-120 for less difficulty walking and with general mobility.             "

## 2023-12-18 ENCOUNTER — TREATMENT (OUTPATIENT)
Dept: PHYSICAL THERAPY | Facility: CLINIC | Age: 88
End: 2023-12-18
Payer: MEDICARE

## 2023-12-18 DIAGNOSIS — Z96.652 PRESENCE OF LEFT ARTIFICIAL KNEE JOINT: ICD-10-CM

## 2023-12-18 DIAGNOSIS — G89.29 CHRONIC PAIN OF LEFT KNEE: ICD-10-CM

## 2023-12-18 DIAGNOSIS — M25.562 CHRONIC PAIN OF LEFT KNEE: ICD-10-CM

## 2023-12-18 DIAGNOSIS — M25.552 LEFT HIP PAIN: ICD-10-CM

## 2023-12-18 PROCEDURE — 97110 THERAPEUTIC EXERCISES: CPT | Mod: GP,KX

## 2023-12-18 ASSESSMENT — PAIN - FUNCTIONAL ASSESSMENT: PAIN_FUNCTIONAL_ASSESSMENT: 0-10

## 2023-12-18 ASSESSMENT — PAIN SCALES - GENERAL: PAINLEVEL_OUTOF10: 0 - NO PAIN

## 2023-12-18 NOTE — PROGRESS NOTES
"Physical Therapy Treatment / Discharge Summary    Patient Name: Buck Leiva  MRN: [id number]  PT Received On: 12/18/23  Time Calculation  Start Time: 1148  Stop Time: 1230  Time Calculation (min): 42 min  PT Therapeutic Procedures Time Entry  Therapeutic Exercise Time Entry: 40    Visit Number: 18  Visits/Dates Authorized: 18    Current Problem   1. Left hip pain  Follow Up In Physical Therapy      2. Chronic pain of left knee  Follow Up In Physical Therapy      3. Presence of left artificial knee joint  Follow Up In Physical Therapy        Precautions:  L TKA on 11/16/22, had a fall leading up to therapy eval. Patient is forgetful and requires frequent verbal and tactile cues for proper exercise technique and sequencing with activities.    Subjective   General   Patient reports no pain lately, just stiffness, mostly in the mornings. He is compliant with HEP, and feels he came a long way from where he was when he first started therapy.     Pre-treatment Pain:   Pain Assessment: 0-10  Pain Score: 0 - No pain  Response to Interventions: Fatigued, no pain    Objective   R trunk lean with standing and ambulation. Cane/walking stick on R to assist with R pelvic drop. Full and pain-free L knee ROM WNL.    Treatments:  Therapeutic Exercise:  Rec Bike L5 - 6 min  Resisted gait 12.5# fwd/bkwd 5x each  HSC 70# 2x10   Seated leg press 30-40-47# 3x12  Pot stir cable 7.5# cw/ccw x10 R/L  Modified SLS 6\" R/L  6\" fw step up 2x10 R/L (cues for L hip engagement and avoiding contralateral pelvic drop)  Squat chair taps no UE 2x8  S/L clam with orange Tband 2x12 R/L    Assessment:   Patient has attended 18 total therapy visits since eval. He displays improved gait and balance, and reports decreased back pain. However, he still continues to present with functional L hip weakness affecting gait and ability to negotiate stairs without trendelenberg. Patient reports good compliance with HEP, performing every day. Patient will be DCd at " this time. Patient educated and agrees with plan.  Plan:   DC with HEP for continued home maintenance.      Goals:   Resolved       PT Problem       STG's (Met)       Start:  09/30/23    Expected End:  10/14/23    Resolved:  11/07/23    - Independent with HEP.         LTG's (Adequate for Discharge)       Start:  09/30/23    Expected End:  12/29/23       1) Pt will score less than 10% impairment on Low Back Disability Questionnaire to indicate significant improvement in low back function.  2) Pt will improve bilateral hip strength to at least 4+/5 for all major muscle groups for improved functional strength with general mobility, walking, and stairs  3) Pt will display symmetrical pain-free gait without AD and without compensation.  4) Pt will display L knee ROM of at least 0-120 for less difficulty walking and with general mobility.

## 2024-02-06 DIAGNOSIS — J45.909 ASTHMA, UNSPECIFIED ASTHMA SEVERITY, UNSPECIFIED WHETHER COMPLICATED, UNSPECIFIED WHETHER PERSISTENT (HHS-HCC): ICD-10-CM

## 2024-02-06 RX ORDER — ALBUTEROL SULFATE 4 MG/1
4 TABLET ORAL DAILY
Qty: 90 TABLET | Refills: 1 | Status: SHIPPED | OUTPATIENT
Start: 2024-02-06

## 2024-02-07 ENCOUNTER — DOCUMENTATION (OUTPATIENT)
Dept: PRIMARY CARE | Facility: CLINIC | Age: 89
End: 2024-02-07
Payer: MEDICARE

## 2024-03-05 ENCOUNTER — TELEPHONE (OUTPATIENT)
Dept: PRIMARY CARE | Facility: CLINIC | Age: 89
End: 2024-03-05
Payer: MEDICARE

## 2024-03-05 DIAGNOSIS — I10 ESSENTIAL HYPERTENSION: ICD-10-CM

## 2024-03-05 DIAGNOSIS — E78.5 HYPERLIPIDEMIA, UNSPECIFIED HYPERLIPIDEMIA TYPE: ICD-10-CM

## 2024-03-05 DIAGNOSIS — Z79.899 MEDICATION MANAGEMENT: ICD-10-CM

## 2024-03-07 ENCOUNTER — LAB (OUTPATIENT)
Dept: LAB | Facility: LAB | Age: 89
End: 2024-03-07
Payer: MEDICARE

## 2024-03-07 DIAGNOSIS — I10 ESSENTIAL HYPERTENSION: ICD-10-CM

## 2024-03-07 DIAGNOSIS — Z79.899 MEDICATION MANAGEMENT: ICD-10-CM

## 2024-03-07 DIAGNOSIS — E78.5 HYPERLIPIDEMIA, UNSPECIFIED HYPERLIPIDEMIA TYPE: ICD-10-CM

## 2024-03-07 LAB
ALBUMIN SERPL-MCNC: 4.3 G/DL (ref 3.5–5)
ALP BLD-CCNC: 86 U/L (ref 35–125)
ALT SERPL-CCNC: 13 U/L (ref 5–40)
ANION GAP SERPL CALC-SCNC: 12 MMOL/L
APPEARANCE UR: CLEAR
AST SERPL-CCNC: 12 U/L (ref 5–40)
BASOPHILS # BLD AUTO: 0.11 X10*3/UL (ref 0–0.1)
BASOPHILS NFR BLD AUTO: 1.3 %
BILIRUB SERPL-MCNC: 0.9 MG/DL (ref 0.1–1.2)
BILIRUB UR STRIP.AUTO-MCNC: NEGATIVE MG/DL
BUN SERPL-MCNC: 13 MG/DL (ref 8–25)
CALCIUM SERPL-MCNC: 9.6 MG/DL (ref 8.5–10.4)
CHLORIDE SERPL-SCNC: 104 MMOL/L (ref 97–107)
CHOLEST SERPL-MCNC: 180 MG/DL (ref 133–200)
CHOLEST/HDLC SERPL: 3.5 {RATIO}
CO2 SERPL-SCNC: 27 MMOL/L (ref 24–31)
COLOR UR: NORMAL
CREAT SERPL-MCNC: 1.3 MG/DL (ref 0.4–1.6)
CREAT UR-MCNC: 77.9 MG/DL
EGFRCR SERPLBLD CKD-EPI 2021: 53 ML/MIN/1.73M*2
EOSINOPHIL # BLD AUTO: 0.37 X10*3/UL (ref 0–0.4)
EOSINOPHIL NFR BLD AUTO: 4.4 %
ERYTHROCYTE [DISTWIDTH] IN BLOOD BY AUTOMATED COUNT: 13.6 % (ref 11.5–14.5)
GLUCOSE SERPL-MCNC: 107 MG/DL (ref 65–99)
GLUCOSE UR STRIP.AUTO-MCNC: NORMAL MG/DL
HCT VFR BLD AUTO: 43 % (ref 41–52)
HDLC SERPL-MCNC: 52 MG/DL
HGB BLD-MCNC: 13.9 G/DL (ref 13.5–17.5)
IMM GRANULOCYTES # BLD AUTO: 0.02 X10*3/UL (ref 0–0.5)
IMM GRANULOCYTES NFR BLD AUTO: 0.2 % (ref 0–0.9)
KETONES UR STRIP.AUTO-MCNC: NEGATIVE MG/DL
LDLC SERPL CALC-MCNC: 106 MG/DL (ref 65–130)
LEUKOCYTE ESTERASE UR QL STRIP.AUTO: NEGATIVE
LYMPHOCYTES # BLD AUTO: 2.85 X10*3/UL (ref 0.8–3)
LYMPHOCYTES NFR BLD AUTO: 33.7 %
MCH RBC QN AUTO: 29.8 PG (ref 26–34)
MCHC RBC AUTO-ENTMCNC: 32.3 G/DL (ref 32–36)
MCV RBC AUTO: 92 FL (ref 80–100)
MICROALBUMIN UR-MCNC: <12 MG/L (ref 0–23)
MICROALBUMIN/CREAT UR: NORMAL MG/G{CREAT}
MONOCYTES # BLD AUTO: 0.99 X10*3/UL (ref 0.05–0.8)
MONOCYTES NFR BLD AUTO: 11.7 %
NEUTROPHILS # BLD AUTO: 4.11 X10*3/UL (ref 1.6–5.5)
NEUTROPHILS NFR BLD AUTO: 48.7 %
NITRITE UR QL STRIP.AUTO: NEGATIVE
NRBC BLD-RTO: 0 /100 WBCS (ref 0–0)
PH UR STRIP.AUTO: 6.5 [PH]
PLATELET # BLD AUTO: 339 X10*3/UL (ref 150–450)
POTASSIUM SERPL-SCNC: 4.4 MMOL/L (ref 3.4–5.1)
PROT SERPL-MCNC: 7.1 G/DL (ref 5.9–7.9)
PROT UR STRIP.AUTO-MCNC: NEGATIVE MG/DL
RBC # BLD AUTO: 4.67 X10*6/UL (ref 4.5–5.9)
RBC # UR STRIP.AUTO: NEGATIVE /UL
SODIUM SERPL-SCNC: 143 MMOL/L (ref 133–145)
SP GR UR STRIP.AUTO: 1.01
TRIGL SERPL-MCNC: 108 MG/DL (ref 40–150)
UROBILINOGEN UR STRIP.AUTO-MCNC: NORMAL MG/DL
WBC # BLD AUTO: 8.5 X10*3/UL (ref 4.4–11.3)

## 2024-03-07 PROCEDURE — 82043 UR ALBUMIN QUANTITATIVE: CPT

## 2024-03-07 PROCEDURE — 80061 LIPID PANEL: CPT

## 2024-03-07 PROCEDURE — 81003 URINALYSIS AUTO W/O SCOPE: CPT

## 2024-03-07 PROCEDURE — 82570 ASSAY OF URINE CREATININE: CPT

## 2024-03-07 PROCEDURE — 80053 COMPREHEN METABOLIC PANEL: CPT

## 2024-03-07 PROCEDURE — 85025 COMPLETE CBC W/AUTO DIFF WBC: CPT

## 2024-03-07 PROCEDURE — 36415 COLL VENOUS BLD VENIPUNCTURE: CPT

## 2024-03-13 ASSESSMENT — PROMIS GLOBAL HEALTH SCALE
RATE_PHYSICAL_HEALTH: VERY GOOD
RATE_GENERAL_HEALTH: VERY GOOD
EMOTIONAL_PROBLEMS: NEVER
RATE_AVERAGE_FATIGUE: MODERATE
RATE_QUALITY_OF_LIFE: VERY GOOD
CARRYOUT_SOCIAL_ACTIVITIES: GOOD
CARRYOUT_PHYSICAL_ACTIVITIES: MOSTLY
RATE_MENTAL_HEALTH: VERY GOOD
RATE_AVERAGE_PAIN: 5
RATE_SOCIAL_SATISFACTION: VERY GOOD

## 2024-03-14 ENCOUNTER — TELEPHONE (OUTPATIENT)
Dept: PRIMARY CARE | Facility: CLINIC | Age: 89
End: 2024-03-14

## 2024-03-14 ENCOUNTER — OFFICE VISIT (OUTPATIENT)
Dept: PRIMARY CARE | Facility: CLINIC | Age: 89
End: 2024-03-14
Payer: MEDICARE

## 2024-03-14 VITALS
HEIGHT: 69 IN | RESPIRATION RATE: 16 BRPM | HEART RATE: 75 BPM | SYSTOLIC BLOOD PRESSURE: 132 MMHG | BODY MASS INDEX: 26.07 KG/M2 | OXYGEN SATURATION: 96 % | DIASTOLIC BLOOD PRESSURE: 82 MMHG | WEIGHT: 176 LBS

## 2024-03-14 DIAGNOSIS — I25.10 CORONARY ARTERY DISEASE INVOLVING NATIVE CORONARY ARTERY OF NATIVE HEART WITHOUT ANGINA PECTORIS: ICD-10-CM

## 2024-03-14 DIAGNOSIS — I10 ESSENTIAL HYPERTENSION: ICD-10-CM

## 2024-03-14 DIAGNOSIS — C18.7 CANCER OF SIGMOID COLON (MULTI): Primary | ICD-10-CM

## 2024-03-14 DIAGNOSIS — E78.5 HYPERLIPIDEMIA, UNSPECIFIED HYPERLIPIDEMIA TYPE: ICD-10-CM

## 2024-03-14 DIAGNOSIS — Z00.00 WELL ADULT EXAM: ICD-10-CM

## 2024-03-14 DIAGNOSIS — E11.9 TYPE 2 DIABETES MELLITUS WITHOUT COMPLICATION, WITHOUT LONG-TERM CURRENT USE OF INSULIN (MULTI): ICD-10-CM

## 2024-03-14 DIAGNOSIS — E78.00 PURE HYPERCHOLESTEROLEMIA: ICD-10-CM

## 2024-03-14 DIAGNOSIS — J41.0 SIMPLE CHRONIC BRONCHITIS (MULTI): ICD-10-CM

## 2024-03-14 PROBLEM — S70.00XA CONTUSION OF HIP: Status: ACTIVE | Noted: 2024-03-14

## 2024-03-14 PROBLEM — Z96.659 HISTORY OF TOTAL KNEE REPLACEMENT: Status: ACTIVE | Noted: 2024-03-14

## 2024-03-14 PROBLEM — D53.9 ANEMIA ASSOCIATED WITH NUTRITIONAL DEFICIENCY: Status: ACTIVE | Noted: 2024-03-14

## 2024-03-14 PROBLEM — M70.62 GREATER TROCHANTERIC BURSITIS OF LEFT HIP: Status: RESOLVED | Noted: 2023-09-13 | Resolved: 2024-03-14

## 2024-03-14 PROBLEM — M53.3 SACROILIAC JOINT PAIN: Status: ACTIVE | Noted: 2024-03-14

## 2024-03-14 PROBLEM — M17.10 ARTHRITIS OF KNEE: Status: ACTIVE | Noted: 2022-11-02

## 2024-03-14 PROBLEM — M75.40 IMPINGEMENT SYNDROME OF SHOULDER REGION: Status: ACTIVE | Noted: 2024-03-14

## 2024-03-14 PROCEDURE — 1036F TOBACCO NON-USER: CPT | Performed by: FAMILY MEDICINE

## 2024-03-14 PROCEDURE — 99213 OFFICE O/P EST LOW 20 MIN: CPT | Performed by: FAMILY MEDICINE

## 2024-03-14 PROCEDURE — 3075F SYST BP GE 130 - 139MM HG: CPT | Performed by: FAMILY MEDICINE

## 2024-03-14 PROCEDURE — G0439 PPPS, SUBSEQ VISIT: HCPCS | Performed by: FAMILY MEDICINE

## 2024-03-14 PROCEDURE — 3079F DIAST BP 80-89 MM HG: CPT | Performed by: FAMILY MEDICINE

## 2024-03-14 PROCEDURE — 1159F MED LIST DOCD IN RCRD: CPT | Performed by: FAMILY MEDICINE

## 2024-03-14 PROCEDURE — 1126F AMNT PAIN NOTED NONE PRSNT: CPT | Performed by: FAMILY MEDICINE

## 2024-03-14 PROCEDURE — 1170F FXNL STATUS ASSESSED: CPT | Performed by: FAMILY MEDICINE

## 2024-03-14 RX ORDER — FLUTICASONE PROPIONATE AND SALMETEROL 100; 50 UG/1; UG/1
POWDER RESPIRATORY (INHALATION)
COMMUNITY
End: 2024-04-24 | Stop reason: WASHOUT

## 2024-03-14 ASSESSMENT — ENCOUNTER SYMPTOMS
LOSS OF SENSATION IN FEET: 0
DEPRESSION: 0
OCCASIONAL FEELINGS OF UNSTEADINESS: 1

## 2024-03-14 ASSESSMENT — PATIENT HEALTH QUESTIONNAIRE - PHQ9
SUM OF ALL RESPONSES TO PHQ9 QUESTIONS 1 AND 2: 0
2. FEELING DOWN, DEPRESSED OR HOPELESS: NOT AT ALL
1. LITTLE INTEREST OR PLEASURE IN DOING THINGS: NOT AT ALL

## 2024-03-14 ASSESSMENT — ACTIVITIES OF DAILY LIVING (ADL)
DRESSING: INDEPENDENT
TAKING_MEDICATION: NEEDS ASSISTANCE
DOING_HOUSEWORK: NEEDS ASSISTANCE
GROCERY_SHOPPING: INDEPENDENT
MANAGING_FINANCES: NEEDS ASSISTANCE
BATHING: INDEPENDENT
MANAGING_FINANCES: NEEDS ASSISTANCE
GROCERY_SHOPPING: NEEDS ASSISTANCE
DOING_HOUSEWORK: NEEDS ASSISTANCE
TAKING_MEDICATION: NEEDS ASSISTANCE

## 2024-03-14 ASSESSMENT — PAIN SCALES - GENERAL: PAINLEVEL: 0-NO PAIN

## 2024-03-14 NOTE — PROGRESS NOTES
Subjective   Reason for Visit: Deny Leiva is an 89 y.o. male here for a Medicare Wellness visit.     Past Medical, Surgical, and Family History reviewed and updated in chart.    Reviewed all medications by prescribing practitioner or clinical pharmacist (such as prescriptions, OTCs, herbal therapies and supplements) and documented in the medical record.    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 Colonoscopy in 11/20  by Dr. Leahy was normal.      2. DENY is seen today also for follow up of coronary artery disease.  He is S/P angioplasty and stent placement. He is followed by Dr. Garcia.     3. DENY is here also for follow up of benign essential hypertension.  He is on lisinopril 5 mg.  (he has white coat syndrome).     4. DENY is seen today also for follow up of High cholesterol.  He is on simvastatin mg. Recent LDL is 106.     5. DENY is seen today also for follow up of COPD.  He is well controlled on Advair.     6. DENY is seen also for follow up of impaired fasting glucose.  He is on no medication.   Recent FBS is 107     7. DENY is seen also for follow up of colon cancer. .  Diagnosed in 2016 (adenocarcinoma of the sigmoid colon). He is S/P partial colectomy followed by chemotherapy. He is followed by Dr. Stroud and Dr. Leahy.     8. DENY presents today also for follow up of back pain.  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 He is followed by pain management.      9. He is also here for follow-up of degenerative joint disease.  He is status post left knee replacement in 2022.    Review of Systems  Denies headache, blurred vision, chest pain, shortness of breath, nausea or vomiting, change in bowel habits or leg pain or swelling.    Objective   Vitals:  /82 (BP Location: Left arm,  "Patient Position: Sitting, BP Cuff Size: Large adult)   Pulse 75   Resp 16   Ht 1.753 m (5' 9\")   Wt 79.8 kg (176 lb)   SpO2 96%   BMI 25.99 kg/m²       Physical Exam  General appearance: Vital signs have been reviewed.  Comfortable.  Well-nourished and well-developed.He is alert and oriented x3 and appears his stated age.The patient is cooperative with exam.  Head: Hair pattern reveals a normal pattern for patient's age and face shows no abnormalities.  Eyes: PERRLA x2, EOMI x2, conjunctive a and sclera are clear.  Ears: External bilateral ears with normal helix, tragus and earlobe.Bilateral canals are normal.Bilateral tympanic membranes are pearly gray and landmarks are well visualized.  Nose: Nasal mucosa both nostrils reveals no polyps, ulcerations or lesions.  Throat:Teeth are in good repair.  Posterior pharynx reveals no abnormalities.  Neck: Supple without lymphadenopathy, thyromegaly or carotid bruits.  Lungs:Clear to auscultation bilaterally with no wheezes, rales or rhonchi.  Cardiovascular: RRR without MRG.No carotid bruits.  Extremities without edema and pulses are intact.  Abdomen: Soft, NT, no masses, no hepatosplenomegaly.  Genitalia: No testicular masses.  No evidence of inguinal hernia.Nontender.  Rectal: No masses.  Prostate is normal in size and shape with no nodules. It is nontender.  Musculoskeletal: 5/5 and equal strength in bilateral upper and lower extremities.  Skin: Good turgor and without rashes.  Neurological: Good overall strength and no focal deficits.  Cranial nerves II through XII are grossly intact.  Psychiatric: Patient has appropriate judgment with good insight.  Mood is appropriate.    Assessment/Plan   Problem List Items Addressed This Visit          High    Cancer of sigmoid colon (CMS/HCC) - Primary    Current Assessment & Plan     Continue following with Dr. Leahy and Dr. Stroud.         Coronary artery disease involving native coronary artery of native heart " without angina pectoris    Current Assessment & Plan     Continue current medications and following with Dr. Singer his cardiologist.         Essential hypertension    Current Assessment & Plan     Continue lisinopril.  Recheck in 6 months.         Hyperlipidemia    Current Assessment & Plan     Continue simvastatin.  Recheck in 6 months.         Type 2 diabetes mellitus without complication (CMS/HCC)    Current Assessment & Plan     Continue off medication.  Will check A1c again in 6 months.         Well adult exam    Current Assessment & Plan     Anticipatory guidance given.         Simple chronic bronchitis (CMS/HCC)    Current Assessment & Plan     Continue Advair and as needed albuterol.  Recheck in 6 months.

## 2024-04-19 ENCOUNTER — APPOINTMENT (OUTPATIENT)
Dept: ORTHOPEDIC SURGERY | Facility: CLINIC | Age: 89
End: 2024-04-19
Payer: MEDICARE

## 2024-04-24 ENCOUNTER — OFFICE VISIT (OUTPATIENT)
Dept: PRIMARY CARE | Facility: CLINIC | Age: 89
End: 2024-04-24
Payer: MEDICARE

## 2024-04-24 VITALS
HEART RATE: 70 BPM | OXYGEN SATURATION: 96 % | DIASTOLIC BLOOD PRESSURE: 80 MMHG | BODY MASS INDEX: 26.66 KG/M2 | WEIGHT: 180 LBS | TEMPERATURE: 97.4 F | SYSTOLIC BLOOD PRESSURE: 150 MMHG | HEIGHT: 69 IN

## 2024-04-24 DIAGNOSIS — R60.0 EDEMA OF BOTH FEET: ICD-10-CM

## 2024-04-24 DIAGNOSIS — M25.472 EDEMA OF BOTH ANKLES: Primary | ICD-10-CM

## 2024-04-24 DIAGNOSIS — M25.471 EDEMA OF BOTH ANKLES: Primary | ICD-10-CM

## 2024-04-24 PROCEDURE — 1159F MED LIST DOCD IN RCRD: CPT | Performed by: FAMILY MEDICINE

## 2024-04-24 PROCEDURE — 1126F AMNT PAIN NOTED NONE PRSNT: CPT | Performed by: FAMILY MEDICINE

## 2024-04-24 PROCEDURE — 3077F SYST BP >= 140 MM HG: CPT | Performed by: FAMILY MEDICINE

## 2024-04-24 PROCEDURE — 3079F DIAST BP 80-89 MM HG: CPT | Performed by: FAMILY MEDICINE

## 2024-04-24 PROCEDURE — 99213 OFFICE O/P EST LOW 20 MIN: CPT | Performed by: FAMILY MEDICINE

## 2024-04-24 PROCEDURE — 1036F TOBACCO NON-USER: CPT | Performed by: FAMILY MEDICINE

## 2024-04-24 RX ORDER — FUROSEMIDE 20 MG/1
20 TABLET ORAL DAILY PRN
Qty: 10 TABLET | Refills: 0 | Status: SHIPPED | OUTPATIENT
Start: 2024-04-24

## 2024-04-24 ASSESSMENT — PAIN SCALES - GENERAL: PAINLEVEL: 0-NO PAIN

## 2024-04-24 ASSESSMENT — PATIENT HEALTH QUESTIONNAIRE - PHQ9
2. FEELING DOWN, DEPRESSED OR HOPELESS: NOT AT ALL
SUM OF ALL RESPONSES TO PHQ9 QUESTIONS 1 AND 2: 0
1. LITTLE INTEREST OR PLEASURE IN DOING THINGS: NOT AT ALL

## 2024-04-24 NOTE — PROGRESS NOTES
"Subjective   Patient ID: Buck Leiva is a 89 y.o. male who presents for Foot Swelling (Bilateral /X 1 month/) and Foot Pain (\"Burning\" sensation).    Bilateral foot swelling and burning: began one month ago, intermittent symptoms, right is more symptomatic, swelling worsens at the end of the day and is resolved in the mornings, walks every other day, follows a low salt diet, denies orthopnea, adds that he used to see podiatry (Dr. Barrientos) for the burning sensation of the feet which was caused by years of running, podiatry used to treat with oral steroids.      Review of Systems   Cardiovascular:  Positive for leg swelling.   Neurological:         Positive for burning sensation feet     Objective   /80 (BP Location: Left arm)   Pulse 70   Temp 36.3 °C (97.4 °F) (Temporal)   Ht 1.753 m (5' 9\")   Wt 81.6 kg (180 lb)   SpO2 96%   BMI 26.58 kg/m²     Physical Exam  Vitals reviewed.   Constitutional:       Appearance: Normal appearance.   Cardiovascular:      Rate and Rhythm: Normal rate and regular rhythm.   Pulmonary:      Effort: Pulmonary effort is normal.      Breath sounds: Normal breath sounds.   Musculoskeletal:      Right lower leg: Edema (ankle > foot) present.      Left lower leg: Edema (ankle > foot) present.   Skin:     General: Skin is warm and dry.      Findings: No bruising or erythema.   Neurological:      Mental Status: He is alert.     Assessment/Plan   Diagnoses and all orders for this visit:  Edema of both ankles/Edema of both feet  New.  Furosemide (Lasix) 20 mg tablet prescribed.  Eat a banana on days when furosemide taken.  Increase water intake.  Regular exercise.  Elevate legs daily.  Compression socks as needed.  Discuss with cardiologist if continues.  Follow up with podiatry if burning continues.     "

## 2024-05-01 ENCOUNTER — OFFICE VISIT (OUTPATIENT)
Dept: PRIMARY CARE | Facility: CLINIC | Age: 89
End: 2024-05-01
Payer: MEDICARE

## 2024-05-01 VITALS
WEIGHT: 180 LBS | HEART RATE: 81 BPM | BODY MASS INDEX: 26.58 KG/M2 | OXYGEN SATURATION: 97 % | SYSTOLIC BLOOD PRESSURE: 140 MMHG | TEMPERATURE: 97.9 F | DIASTOLIC BLOOD PRESSURE: 70 MMHG

## 2024-05-01 DIAGNOSIS — B37.0 THRUSH: Primary | ICD-10-CM

## 2024-05-01 PROCEDURE — 1126F AMNT PAIN NOTED NONE PRSNT: CPT | Performed by: FAMILY MEDICINE

## 2024-05-01 PROCEDURE — 99213 OFFICE O/P EST LOW 20 MIN: CPT | Performed by: FAMILY MEDICINE

## 2024-05-01 PROCEDURE — 3077F SYST BP >= 140 MM HG: CPT | Performed by: FAMILY MEDICINE

## 2024-05-01 PROCEDURE — 1036F TOBACCO NON-USER: CPT | Performed by: FAMILY MEDICINE

## 2024-05-01 PROCEDURE — 3078F DIAST BP <80 MM HG: CPT | Performed by: FAMILY MEDICINE

## 2024-05-01 PROCEDURE — 1159F MED LIST DOCD IN RCRD: CPT | Performed by: FAMILY MEDICINE

## 2024-05-01 RX ORDER — NYSTATIN 100000 [USP'U]/ML
5 SUSPENSION ORAL 4 TIMES DAILY
Qty: 140 ML | Refills: 0 | Status: SHIPPED | OUTPATIENT
Start: 2024-05-01 | End: 2024-05-08

## 2024-05-01 ASSESSMENT — ENCOUNTER SYMPTOMS: SORE THROAT: 1

## 2024-05-01 ASSESSMENT — PAIN SCALES - GENERAL: PAINLEVEL: 0-NO PAIN

## 2024-05-01 NOTE — PROGRESS NOTES
Subjective   Patient ID: Buck Leiva is a 89 y.o. male who presents for Thrush (On tongue x 3 days).    Thrush: began 3 days ago, complained of throat pain and noticed white tongue, able to rub off white with teeth, no tongue soreness, rinsed mouth with hydrogen peroxide 5 times, does not use any steroid inhalers, no change in diet, no new supplements.    Review of Systems   HENT:  Positive for sore throat.         Positive for white coating on tongue.     Objective   /70 (BP Location: Left arm)   Pulse 81   Temp 36.6 °C (97.9 °F) (Temporal)   Wt 81.6 kg (180 lb)   SpO2 97%   BMI 26.58 kg/m²     Physical Exam  Vitals reviewed.   Constitutional:       Appearance: Normal appearance.      Comments: Accompanied by wife   HENT:      Mouth/Throat:      Pharynx: Posterior oropharyngeal erythema present. No oropharyngeal exudate.      Comments: White coating on tongue that is not removable.    Cardiovascular:      Rate and Rhythm: Normal rate and regular rhythm.   Pulmonary:      Effort: Pulmonary effort is normal.      Breath sounds: Normal breath sounds.   Neurological:      Mental Status: He is alert.     Assessment/Plan   Diagnoses and all orders for this visit:  Thrush  New.  Nystatin (Mycostatin) 100,000 unit/mL suspension prescribed.  Continue with routine dental care.  Follow up with PCP in 1 week if not better.

## 2024-07-02 DIAGNOSIS — J45.909 ASTHMA, UNSPECIFIED ASTHMA SEVERITY, UNSPECIFIED WHETHER COMPLICATED, UNSPECIFIED WHETHER PERSISTENT (HHS-HCC): ICD-10-CM

## 2024-07-02 RX ORDER — ALBUTEROL SULFATE 4 MG/1
4 TABLET ORAL DAILY
Qty: 90 TABLET | Refills: 1 | Status: SHIPPED | OUTPATIENT
Start: 2024-07-02

## 2024-07-22 ENCOUNTER — APPOINTMENT (OUTPATIENT)
Dept: CARDIOLOGY | Facility: CLINIC | Age: 89
End: 2024-07-22
Payer: MEDICARE

## 2024-08-13 ENCOUNTER — TELEPHONE (OUTPATIENT)
Dept: PRIMARY CARE | Facility: CLINIC | Age: 89
End: 2024-08-13
Payer: MEDICARE

## 2024-08-13 DIAGNOSIS — N40.0 BENIGN PROSTATIC HYPERPLASIA WITHOUT LOWER URINARY TRACT SYMPTOMS: ICD-10-CM

## 2024-08-13 RX ORDER — TAMSULOSIN HYDROCHLORIDE 0.4 MG/1
0.4 CAPSULE ORAL DAILY
Qty: 90 CAPSULE | Refills: 1 | Status: SHIPPED | OUTPATIENT
Start: 2024-08-13

## 2024-08-13 NOTE — TELEPHONE ENCOUNTER
Patient called on RX line to request a refill of Tamsulosin 0.4 mg be forwarded to [facility] in [place] on [address]  Please advise.     Ph# [phone]

## 2024-08-20 ENCOUNTER — OFFICE VISIT (OUTPATIENT)
Dept: CARDIOLOGY | Facility: CLINIC | Age: 89
End: 2024-08-20
Payer: MEDICARE

## 2024-08-20 VITALS
WEIGHT: 185 LBS | SYSTOLIC BLOOD PRESSURE: 128 MMHG | BODY MASS INDEX: 27.32 KG/M2 | OXYGEN SATURATION: 97 % | HEART RATE: 68 BPM | DIASTOLIC BLOOD PRESSURE: 70 MMHG

## 2024-08-20 DIAGNOSIS — I35.0 AORTIC VALVE STENOSIS: Primary | ICD-10-CM

## 2024-08-20 PROCEDURE — 3074F SYST BP LT 130 MM HG: CPT | Performed by: INTERNAL MEDICINE

## 2024-08-20 PROCEDURE — 99214 OFFICE O/P EST MOD 30 MIN: CPT | Performed by: INTERNAL MEDICINE

## 2024-08-20 PROCEDURE — 1159F MED LIST DOCD IN RCRD: CPT | Performed by: INTERNAL MEDICINE

## 2024-08-20 PROCEDURE — 1126F AMNT PAIN NOTED NONE PRSNT: CPT | Performed by: INTERNAL MEDICINE

## 2024-08-20 PROCEDURE — 3078F DIAST BP <80 MM HG: CPT | Performed by: INTERNAL MEDICINE

## 2024-08-20 PROCEDURE — 1036F TOBACCO NON-USER: CPT | Performed by: INTERNAL MEDICINE

## 2024-08-20 RX ORDER — GABAPENTIN 300 MG/1
300 CAPSULE ORAL 3 TIMES DAILY
COMMUNITY

## 2024-08-20 ASSESSMENT — PAIN SCALES - GENERAL: PAINLEVEL: 0-NO PAIN

## 2024-08-20 ASSESSMENT — ENCOUNTER SYMPTOMS
OCCASIONAL FEELINGS OF UNSTEADINESS: 0
LOSS OF SENSATION IN FEET: 0
DEPRESSION: 0

## 2024-08-20 NOTE — PROGRESS NOTES
Subjective      No chief complaint on file.       Here to reassess coronary artery disease and remains angina free with no heart    Previous note: Has a history of remote circumflex stenting in 2008, and was instructed to have nuclear stress testing at his previous visit.  He is nuclear stress test October 27, 2022 showed no evidence of stress-induced ischemia and left ventricular ejection fraction estimated at 70%.              Review of Systems   All other systems reviewed and are negative.       Objective   Physical Exam  Constitutional:       Appearance: Normal appearance.   HENT:      Head: Normocephalic and atraumatic.   Eyes:      Pupils: Pupils are equal, round, and reactive to light.   Cardiovascular:      Rate and Rhythm: Normal rate and regular rhythm.      Pulses: Normal pulses.      Heart sounds: Murmur heard.      Comments: 1/6 aortic systolic ejection murmur loudest at the aortic region  Pulmonary:      Effort: Pulmonary effort is normal.      Breath sounds: Normal breath sounds.   Abdominal:      General: Abdomen is flat. Bowel sounds are normal.      Palpations: Abdomen is soft.   Musculoskeletal:         General: Normal range of motion.      Cervical back: Normal range of motion.   Skin:     General: Skin is warm and dry.   Neurological:      General: No focal deficit present.   Psychiatric:         Mood and Affect: Mood normal.         Judgment: Judgment normal.          Lab Review:   Not applicable    History of coronary artery stent placement  Nonischemic nuclear stress test previously with normal LV post stenting on low-dose aspirin    Hyperlipidemia  On chronic statin therapy with periodic metabolic profile and lipid profile can follow-up in 9 months

## 2024-08-20 NOTE — ASSESSMENT & PLAN NOTE
On chronic statin therapy with periodic metabolic profile and lipid profile can follow-up in 9 months

## 2024-08-27 DIAGNOSIS — I25.10 ATHEROSCLEROTIC HEART DISEASE OF NATIVE CORONARY ARTERY WITHOUT ANGINA PECTORIS: ICD-10-CM

## 2024-08-27 RX ORDER — AMLODIPINE BESYLATE 5 MG/1
5 TABLET ORAL DAILY
Qty: 90 TABLET | Refills: 3 | Status: SHIPPED | OUTPATIENT
Start: 2024-08-27

## 2024-09-09 ENCOUNTER — LAB (OUTPATIENT)
Dept: LAB | Facility: LAB | Age: 89
End: 2024-09-09
Payer: MEDICARE

## 2024-09-09 DIAGNOSIS — E11.9 TYPE 2 DIABETES MELLITUS WITHOUT COMPLICATION, WITHOUT LONG-TERM CURRENT USE OF INSULIN (MULTI): ICD-10-CM

## 2024-09-09 DIAGNOSIS — E78.5 HYPERLIPIDEMIA, UNSPECIFIED HYPERLIPIDEMIA TYPE: ICD-10-CM

## 2024-09-09 DIAGNOSIS — I10 ESSENTIAL HYPERTENSION: ICD-10-CM

## 2024-09-09 LAB
ALBUMIN SERPL-MCNC: 4.2 G/DL (ref 3.5–5)
ALP BLD-CCNC: 86 U/L (ref 35–125)
ALT SERPL-CCNC: 15 U/L (ref 5–40)
ANION GAP SERPL CALC-SCNC: 11 MMOL/L
APPEARANCE UR: CLEAR
AST SERPL-CCNC: 12 U/L (ref 5–40)
BILIRUB SERPL-MCNC: 0.9 MG/DL (ref 0.1–1.2)
BILIRUB UR STRIP.AUTO-MCNC: NEGATIVE MG/DL
BUN SERPL-MCNC: 21 MG/DL (ref 8–25)
CALCIUM SERPL-MCNC: 9.8 MG/DL (ref 8.5–10.4)
CHLORIDE SERPL-SCNC: 106 MMOL/L (ref 97–107)
CHOLEST SERPL-MCNC: 188 MG/DL (ref 133–200)
CHOLEST/HDLC SERPL: 3.5 {RATIO}
CO2 SERPL-SCNC: 27 MMOL/L (ref 24–31)
COLOR UR: NORMAL
CREAT SERPL-MCNC: 1.4 MG/DL (ref 0.4–1.6)
EGFRCR SERPLBLD CKD-EPI 2021: 49 ML/MIN/1.73M*2
EST. AVERAGE GLUCOSE BLD GHB EST-MCNC: 126 MG/DL
GLUCOSE SERPL-MCNC: 109 MG/DL (ref 65–99)
GLUCOSE UR STRIP.AUTO-MCNC: NORMAL MG/DL
HBA1C MFR BLD: 6 %
HDLC SERPL-MCNC: 54 MG/DL
KETONES UR STRIP.AUTO-MCNC: NEGATIVE MG/DL
LDLC SERPL CALC-MCNC: 113 MG/DL (ref 65–130)
LEUKOCYTE ESTERASE UR QL STRIP.AUTO: NEGATIVE
NITRITE UR QL STRIP.AUTO: NEGATIVE
PH UR STRIP.AUTO: 6.5 [PH]
POTASSIUM SERPL-SCNC: 4.3 MMOL/L (ref 3.4–5.1)
PROT SERPL-MCNC: 7.1 G/DL (ref 5.9–7.9)
PROT UR STRIP.AUTO-MCNC: NEGATIVE MG/DL
RBC # UR STRIP.AUTO: NEGATIVE /UL
SODIUM SERPL-SCNC: 144 MMOL/L (ref 133–145)
SP GR UR STRIP.AUTO: 1.02
TRIGL SERPL-MCNC: 107 MG/DL (ref 40–150)
UROBILINOGEN UR STRIP.AUTO-MCNC: NORMAL MG/DL

## 2024-09-09 PROCEDURE — 83036 HEMOGLOBIN GLYCOSYLATED A1C: CPT

## 2024-09-09 PROCEDURE — 80061 LIPID PANEL: CPT

## 2024-09-09 PROCEDURE — 36415 COLL VENOUS BLD VENIPUNCTURE: CPT

## 2024-09-09 PROCEDURE — 81003 URINALYSIS AUTO W/O SCOPE: CPT

## 2024-09-09 PROCEDURE — 80053 COMPREHEN METABOLIC PANEL: CPT

## 2024-09-16 ENCOUNTER — TELEPHONE (OUTPATIENT)
Dept: PRIMARY CARE | Facility: CLINIC | Age: 89
End: 2024-09-16

## 2024-09-16 ENCOUNTER — APPOINTMENT (OUTPATIENT)
Dept: PRIMARY CARE | Facility: CLINIC | Age: 89
End: 2024-09-16
Payer: MEDICARE

## 2024-09-16 VITALS
OXYGEN SATURATION: 97 % | BODY MASS INDEX: 26.88 KG/M2 | RESPIRATION RATE: 18 BRPM | WEIGHT: 182 LBS | SYSTOLIC BLOOD PRESSURE: 130 MMHG | DIASTOLIC BLOOD PRESSURE: 76 MMHG | HEART RATE: 70 BPM

## 2024-09-16 DIAGNOSIS — E11.9 TYPE 2 DIABETES MELLITUS WITHOUT COMPLICATION, WITHOUT LONG-TERM CURRENT USE OF INSULIN (MULTI): ICD-10-CM

## 2024-09-16 DIAGNOSIS — Z79.899 MEDICATION MANAGEMENT: ICD-10-CM

## 2024-09-16 DIAGNOSIS — I10 ESSENTIAL HYPERTENSION: ICD-10-CM

## 2024-09-16 DIAGNOSIS — I25.10 CORONARY ARTERY DISEASE INVOLVING NATIVE CORONARY ARTERY OF NATIVE HEART WITHOUT ANGINA PECTORIS: ICD-10-CM

## 2024-09-16 DIAGNOSIS — E78.00 PURE HYPERCHOLESTEROLEMIA: ICD-10-CM

## 2024-09-16 DIAGNOSIS — E11.9 TYPE 2 DIABETES MELLITUS WITHOUT COMPLICATION, WITHOUT LONG-TERM CURRENT USE OF INSULIN (MULTI): Primary | ICD-10-CM

## 2024-09-16 PROCEDURE — 99214 OFFICE O/P EST MOD 30 MIN: CPT | Performed by: FAMILY MEDICINE

## 2024-09-16 PROCEDURE — 1159F MED LIST DOCD IN RCRD: CPT | Performed by: FAMILY MEDICINE

## 2024-09-16 PROCEDURE — 3078F DIAST BP <80 MM HG: CPT | Performed by: FAMILY MEDICINE

## 2024-09-16 PROCEDURE — 3075F SYST BP GE 130 - 139MM HG: CPT | Performed by: FAMILY MEDICINE

## 2024-09-16 PROCEDURE — 1036F TOBACCO NON-USER: CPT | Performed by: FAMILY MEDICINE

## 2024-09-16 PROCEDURE — 1126F AMNT PAIN NOTED NONE PRSNT: CPT | Performed by: FAMILY MEDICINE

## 2024-09-16 RX ORDER — AMOXICILLIN 500 MG/1
CAPSULE ORAL
COMMUNITY
Start: 2024-08-19

## 2024-09-16 RX ORDER — VIT C/E/ZN/COPPR/LUTEIN/ZEAXAN 250MG-90MG
CAPSULE ORAL EVERY 12 HOURS
COMMUNITY

## 2024-09-16 RX ORDER — KETOTIFEN FUMARATE 0.35 MG/ML
SOLUTION/ DROPS OPHTHALMIC
COMMUNITY

## 2024-09-16 ASSESSMENT — ENCOUNTER SYMPTOMS
LOSS OF SENSATION IN FEET: 0
DEPRESSION: 0
HYPERTENSION: 1
OCCASIONAL FEELINGS OF UNSTEADINESS: 0

## 2024-09-16 ASSESSMENT — PAIN SCALES - GENERAL: PAINLEVEL: 0-NO PAIN

## 2024-09-16 NOTE — PROGRESS NOTES
Subjective   Patient ID: Deny Leiva is a 89 y.o. male who presents for Hyperlipidemia (Patient is here for a CHOL check, patient refused the flu shot), Hypertension (Patient is here for a HTN check), and Diabetes (Patient is here for a DM check).    HPI   1. DENY is seen today also for follow up of coronary artery disease.  He is S/P angioplasty and stent placement. He is followed by Dr. Garcia.       2. DENY is here also for follow up of benign essential hypertension.  He is on amlodipine 2.5 mg. (Lisinopril was stopped the past due to elevated creatinine.)     3. DENY is seen today also for follow up of High cholesterol.  He is on simvastatin 20 mg. Recent LDL is 113.      4. DENY is seen today also for follow up of COPD.  He is well controlled on albuterol tablets daily and PRN Advair.     5. DENY is seen also for follow up of impaired fasting glucose.  He is on no medication. Recent A1 c is 6.0.      6. DENY is seen today also for BPH.  He is on Flomax (started 10/17) and is doing well.  Review of Systems  Denies headache, blurred vision, chest pain, shortness of breath, nausea or vomiting, change in bowel habits or leg pain or swelling.    Objective   /76 (BP Location: Left arm, Patient Position: Sitting, BP Cuff Size: Large adult)   Pulse 70   Resp 18   Wt 82.6 kg (182 lb)   SpO2 97%   BMI 26.88 kg/m²     Physical Exam  Vitals and nurse's notes reviewed  General: no acute distress  HEENT: Normal  Neck: Supple  Lungs: Clear  Cardio: RRR w/o murmur  Abdomen: Soft, nontender, no hepatosplenomegaly  Extremities: No edema, no calf tenderness  Neuro: Alert and oriented with no focal deficits    Assessment/Plan   Problem List Items Addressed This Visit             ICD-10-CM       High    Coronary artery disease involving native coronary artery of native heart without angina pectoris I25.10     Continue current medication and follow with cadrdiologist.          Essential hypertension I10     Continue  current medication and follow with cardiologist.         Hyperlipidemia E78.5     Continue simvastatin.  Recheck in 6 months.         Type 2 diabetes mellitus without complication (Multi) - Primary E11.9     Keep off medication.  Recheck in 6 months at CPE.

## 2024-09-16 NOTE — PROGRESS NOTES
Subjective   Reason for Visit: Buck Leiva is an 89 y.o. male here for a Medicare Wellness visit.     Past Medical, Surgical, and Family History reviewed and updated in chart.    Reviewed all medications by prescribing practitioner or clinical pharmacist (such as prescriptions, OTCs, herbal therapies and supplements) and documented in the medical record.    Hyperlipidemia    Hypertension    Diabetes        Patient Care Team:  Emilio Mejia MD as PCP - General (Family Medicine)  Emilio Mejia MD as Primary Care Provider  Sarah Chambers as Care Manager (Case Management)   Buck Leiva is seen for comprehensive physical exam.  PMH, PSH, family history and social history were reviewed and updated.    Buck's wife helps him take his medications appropriately with refills.    Hypertension: Stable, taking Furosemide 20mg daily PRN, Amlodipine 5mg daily as prescribed.   Hypercholesterolemia: Controlled, taking Simvastatin 20mg daily as prescribed.  Diabetes: Controlled, not medicated, diet and exercise controlled.    Review of Systems   All other systems reviewed and are negative.      Objective   Vitals:  /76 (BP Location: Left arm, Patient Position: Sitting, BP Cuff Size: Large adult)   Pulse 70   Resp 18   Wt 82.6 kg (182 lb)   SpO2 97%   BMI 26.88 kg/m²       Physical Exam    Assessment/Plan   {Assess/PlanSmartLinks:50468}    Follow up {FOLLOW UP INTERVALS:96236}, sooner with any problems or concerns.    {AWV Counseling (Optional):91332}

## 2024-09-20 ENCOUNTER — HOSPITAL ENCOUNTER (OUTPATIENT)
Dept: CARDIOLOGY | Facility: HOSPITAL | Age: 89
Discharge: HOME | End: 2024-09-20
Payer: MEDICARE

## 2024-09-20 DIAGNOSIS — I35.0 AORTIC VALVE STENOSIS: ICD-10-CM

## 2024-09-20 LAB
AORTIC VALVE PEAK VELOCITY: 1.45 M/S
AV PEAK GRADIENT: 8.4 MMHG
AVA (PEAK VEL): 2.48 CM2
EJECTION FRACTION APICAL 4 CHAMBER: 48.8
EJECTION FRACTION: 63 %
GLOBAL LONGITUDINAL STRAIN: -16.8 %
LEFT ATRIUM VOLUME AREA LENGTH INDEX BSA: 29.2 ML/M2
LEFT VENTRICLE INTERNAL DIMENSION DIASTOLE: 2.72 CM (ref 3.5–6)
LEFT VENTRICULAR OUTFLOW TRACT DIAMETER: 1.9 CM
LV EJECTION FRACTION BIPLANE: 55 %
MITRAL VALVE E/A RATIO: 0.77
RIGHT VENTRICLE FREE WALL PEAK S': 12.5 CM/S
RIGHT VENTRICLE PEAK SYSTOLIC PRESSURE: 20.6 MMHG
TRICUSPID ANNULAR PLANE SYSTOLIC EXCURSION: 2.1 CM

## 2024-09-20 PROCEDURE — 93306 TTE W/DOPPLER COMPLETE: CPT

## 2024-09-20 PROCEDURE — 93306 TTE W/DOPPLER COMPLETE: CPT | Performed by: INTERNAL MEDICINE

## 2024-09-20 PROCEDURE — 76376 3D RENDER W/INTRP POSTPROCES: CPT | Performed by: INTERNAL MEDICINE

## 2024-09-20 PROCEDURE — 93356 MYOCRD STRAIN IMG SPCKL TRCK: CPT | Performed by: INTERNAL MEDICINE

## 2024-11-27 DIAGNOSIS — I25.10 ATHEROSCLEROTIC HEART DISEASE OF NATIVE CORONARY ARTERY WITHOUT ANGINA PECTORIS: ICD-10-CM

## 2024-12-03 RX ORDER — SIMVASTATIN 20 MG/1
TABLET, FILM COATED ORAL
Qty: 90 TABLET | Refills: 2 | Status: SHIPPED | OUTPATIENT
Start: 2024-12-03

## 2025-01-16 DIAGNOSIS — I10 ESSENTIAL HYPERTENSION: ICD-10-CM

## 2025-01-16 RX ORDER — CYANOCOBALAMIN 1000 UG/ML
INJECTION, SOLUTION INTRAMUSCULAR; SUBCUTANEOUS
Qty: 4 ML | Refills: 4 | Status: SHIPPED | OUTPATIENT
Start: 2025-01-16

## 2025-01-31 ENCOUNTER — TELEPHONE (OUTPATIENT)
Dept: PRIMARY CARE | Facility: CLINIC | Age: 89
End: 2025-01-31
Payer: MEDICARE

## 2025-01-31 DIAGNOSIS — J45.909 ASTHMA, UNSPECIFIED ASTHMA SEVERITY, UNSPECIFIED WHETHER COMPLICATED, UNSPECIFIED WHETHER PERSISTENT (HHS-HCC): ICD-10-CM

## 2025-01-31 DIAGNOSIS — N40.0 BENIGN PROSTATIC HYPERPLASIA WITHOUT LOWER URINARY TRACT SYMPTOMS: ICD-10-CM

## 2025-01-31 DIAGNOSIS — I25.10 ATHEROSCLEROTIC HEART DISEASE OF NATIVE CORONARY ARTERY WITHOUT ANGINA PECTORIS: ICD-10-CM

## 2025-01-31 NOTE — TELEPHONE ENCOUNTER
Patient called Rx line and requested a refill for Amlodipine 5 mg and Tamsulosin 0.4 mg be [facility].   He also requested his Albuterol 90 mcg inhaler be called into [facility].  Last ov 4/24/2024.

## 2025-02-01 RX ORDER — TAMSULOSIN HYDROCHLORIDE 0.4 MG/1
0.4 CAPSULE ORAL DAILY
Qty: 90 CAPSULE | Refills: 1 | Status: SHIPPED | OUTPATIENT
Start: 2025-02-01

## 2025-02-01 RX ORDER — ALBUTEROL SULFATE 4 MG/1
4 TABLET ORAL DAILY
Qty: 90 TABLET | Refills: 1 | Status: SHIPPED | OUTPATIENT
Start: 2025-02-01

## 2025-02-01 RX ORDER — AMLODIPINE BESYLATE 5 MG/1
5 TABLET ORAL DAILY
Qty: 90 TABLET | Refills: 3 | Status: SHIPPED | OUTPATIENT
Start: 2025-02-01

## 2025-04-15 ENCOUNTER — OFFICE VISIT (OUTPATIENT)
Dept: CARDIOLOGY | Facility: CLINIC | Age: 89
End: 2025-04-15
Payer: MEDICARE

## 2025-04-15 VITALS
BODY MASS INDEX: 27.62 KG/M2 | SYSTOLIC BLOOD PRESSURE: 138 MMHG | WEIGHT: 187 LBS | HEART RATE: 60 BPM | RESPIRATION RATE: 16 BRPM | OXYGEN SATURATION: 97 % | DIASTOLIC BLOOD PRESSURE: 68 MMHG

## 2025-04-15 DIAGNOSIS — I25.10 ATHEROSCLEROTIC HEART DISEASE OF NATIVE CORONARY ARTERY WITHOUT ANGINA PECTORIS: ICD-10-CM

## 2025-04-15 PROCEDURE — 3078F DIAST BP <80 MM HG: CPT | Performed by: INTERNAL MEDICINE

## 2025-04-15 PROCEDURE — 99214 OFFICE O/P EST MOD 30 MIN: CPT | Performed by: INTERNAL MEDICINE

## 2025-04-15 PROCEDURE — 3075F SYST BP GE 130 - 139MM HG: CPT | Performed by: INTERNAL MEDICINE

## 2025-04-15 PROCEDURE — 1126F AMNT PAIN NOTED NONE PRSNT: CPT | Performed by: INTERNAL MEDICINE

## 2025-04-15 PROCEDURE — 1159F MED LIST DOCD IN RCRD: CPT | Performed by: INTERNAL MEDICINE

## 2025-04-15 PROCEDURE — 1036F TOBACCO NON-USER: CPT | Performed by: INTERNAL MEDICINE

## 2025-04-15 RX ORDER — SIMVASTATIN 20 MG/1
20 TABLET, FILM COATED ORAL NIGHTLY
Qty: 90 TABLET | Refills: 3 | Status: SHIPPED | OUTPATIENT
Start: 2025-04-15

## 2025-04-15 RX ORDER — EZETIMIBE 10 MG/1
10 TABLET ORAL DAILY
Qty: 30 TABLET | Refills: 11 | Status: SHIPPED | OUTPATIENT
Start: 2025-04-15 | End: 2026-04-15

## 2025-04-15 ASSESSMENT — ENCOUNTER SYMPTOMS
OCCASIONAL FEELINGS OF UNSTEADINESS: 0
LOSS OF SENSATION IN FEET: 0
DEPRESSION: 0

## 2025-04-15 ASSESSMENT — LIFESTYLE VARIABLES
AUDIT TOTAL SCORE: 3
AUDIT-C TOTAL SCORE: 3
SKIP TO QUESTIONS 9-10: 1
HAVE YOU OR SOMEONE ELSE BEEN INJURED AS A RESULT OF YOUR DRINKING: NO
HAS A RELATIVE, FRIEND, DOCTOR, OR ANOTHER HEALTH PROFESSIONAL EXPRESSED CONCERN ABOUT YOUR DRINKING OR SUGGESTED YOU CUT DOWN: NO
HOW OFTEN DO YOU HAVE A DRINK CONTAINING ALCOHOL: 2-3 TIMES A WEEK
HOW MANY STANDARD DRINKS CONTAINING ALCOHOL DO YOU HAVE ON A TYPICAL DAY: 1 OR 2
HOW OFTEN DO YOU HAVE SIX OR MORE DRINKS ON ONE OCCASION: NEVER

## 2025-04-15 ASSESSMENT — PAIN SCALES - GENERAL: PAINLEVEL_OUTOF10: 0-NO PAIN

## 2025-04-15 ASSESSMENT — PATIENT HEALTH QUESTIONNAIRE - PHQ9
1. LITTLE INTEREST OR PLEASURE IN DOING THINGS: NOT AT ALL
SUM OF ALL RESPONSES TO PHQ9 QUESTIONS 1 AND 2: 0
2. FEELING DOWN, DEPRESSED OR HOPELESS: NOT AT ALL

## 2025-04-15 NOTE — ASSESSMENT & PLAN NOTE
Continues on low-dose statin therapy, LDLs in the 113 range.    Talked about the addition of Zetia to lower statin levels

## 2025-04-15 NOTE — ASSESSMENT & PLAN NOTE
Nonischemic nuclear stress test previously with normal LV post stenting on low-dose aspirin     Continue daily walking/exercise and low carbohydrate low sugar Mediterranean dietary lifestyle discussed

## 2025-04-15 NOTE — PROGRESS NOTES
Subjective      Chief Complaint   Patient presents with    Follow-up     Follow up,         Previous note: Has a history of remote circumflex stenting in 2008, and was instructed to have nuclear stress testing at his previous visit.  He is nuclear stress test October 27, 2022 showed no evidence of stress-induced ischemia and left ventricular ejection fraction estimated at 70%.              Review of Systems   All other systems reviewed and are negative.       Objective   Physical Exam  Constitutional:       Appearance: Normal appearance.   HENT:      Head: Normocephalic and atraumatic.   Eyes:      Pupils: Pupils are equal, round, and reactive to light.   Cardiovascular:      Rate and Rhythm: Normal rate and regular rhythm.      Pulses: Normal pulses.      Heart sounds: Normal heart sounds.   Pulmonary:      Effort: Pulmonary effort is normal.      Breath sounds: Normal breath sounds.   Abdominal:      General: Abdomen is flat. Bowel sounds are normal.      Palpations: Abdomen is soft.   Musculoskeletal:         General: Normal range of motion.      Cervical back: Normal range of motion.   Skin:     General: Skin is warm and dry.   Neurological:      General: No focal deficit present.   Psychiatric:         Mood and Affect: Mood normal.         Judgment: Judgment normal.          Lab Review:   Not applicable    History of coronary artery stent placement  Nonischemic nuclear stress test previously with normal LV post stenting on low-dose aspirin     Continue daily walking/exercise and low carbohydrate low sugar Mediterranean dietary lifestyle discussed    Hyperlipidemia  Continues on low-dose statin therapy, LDLs in the 113 range.    Talked about the addition of Zetia to lower statin levels

## 2025-05-10 LAB
ALBUMIN SERPL-MCNC: 4.4 G/DL (ref 3.6–5.1)
ALP SERPL-CCNC: 66 U/L (ref 35–144)
ALT SERPL-CCNC: 22 U/L (ref 9–46)
ANION GAP SERPL CALCULATED.4IONS-SCNC: 8 MMOL/L (CALC) (ref 7–17)
AST SERPL-CCNC: 15 U/L (ref 10–35)
BASOPHILS # BLD AUTO: 59 CELLS/UL (ref 0–200)
BASOPHILS NFR BLD AUTO: 0.8 %
BILIRUB SERPL-MCNC: 1.3 MG/DL (ref 0.2–1.2)
BUN SERPL-MCNC: 17 MG/DL (ref 7–25)
CALCIUM SERPL-MCNC: 9.3 MG/DL (ref 8.6–10.3)
CHLORIDE SERPL-SCNC: 105 MMOL/L (ref 98–110)
CHOLEST SERPL-MCNC: 134 MG/DL
CHOLEST/HDLC SERPL: 2.4 (CALC)
CO2 SERPL-SCNC: 28 MMOL/L (ref 20–32)
CREAT SERPL-MCNC: 1.51 MG/DL (ref 0.7–1.22)
EGFRCR SERPLBLD CKD-EPI 2021: 44 ML/MIN/1.73M2
EOSINOPHIL # BLD AUTO: 163 CELLS/UL (ref 15–500)
EOSINOPHIL NFR BLD AUTO: 2.2 %
ERYTHROCYTE [DISTWIDTH] IN BLOOD BY AUTOMATED COUNT: 13.4 % (ref 11–15)
EST. AVERAGE GLUCOSE BLD GHB EST-MCNC: 126 MG/DL
EST. AVERAGE GLUCOSE BLD GHB EST-SCNC: 7 MMOL/L
GLUCOSE SERPL-MCNC: 114 MG/DL (ref 65–99)
HBA1C MFR BLD: 6 %
HCT VFR BLD AUTO: 44.8 % (ref 38.5–50)
HDLC SERPL-MCNC: 55 MG/DL
HGB BLD-MCNC: 14.6 G/DL (ref 13.2–17.1)
LDLC SERPL CALC-MCNC: 58 MG/DL (CALC)
LYMPHOCYTES # BLD AUTO: 2538 CELLS/UL (ref 850–3900)
LYMPHOCYTES NFR BLD AUTO: 34.3 %
MCH RBC QN AUTO: 29.3 PG (ref 27–33)
MCHC RBC AUTO-ENTMCNC: 32.6 G/DL (ref 32–36)
MCV RBC AUTO: 89.8 FL (ref 80–100)
MONOCYTES # BLD AUTO: 718 CELLS/UL (ref 200–950)
MONOCYTES NFR BLD AUTO: 9.7 %
NEUTROPHILS # BLD AUTO: 3922 CELLS/UL (ref 1500–7800)
NEUTROPHILS NFR BLD AUTO: 53 %
NONHDLC SERPL-MCNC: 79 MG/DL (CALC)
PLATELET # BLD AUTO: 299 THOUSAND/UL (ref 140–400)
PMV BLD REES-ECKER: 11.9 FL (ref 7.5–12.5)
POTASSIUM SERPL-SCNC: 4.3 MMOL/L (ref 3.5–5.3)
PROT SERPL-MCNC: 7.3 G/DL (ref 6.1–8.1)
RBC # BLD AUTO: 4.99 MILLION/UL (ref 4.2–5.8)
SODIUM SERPL-SCNC: 141 MMOL/L (ref 135–146)
TRIGL SERPL-MCNC: 119 MG/DL
WBC # BLD AUTO: 7.4 THOUSAND/UL (ref 3.8–10.8)

## 2025-05-16 ENCOUNTER — APPOINTMENT (OUTPATIENT)
Dept: PRIMARY CARE | Facility: CLINIC | Age: 89
End: 2025-05-16
Payer: MEDICARE

## 2025-05-16 ENCOUNTER — TELEPHONE (OUTPATIENT)
Dept: PRIMARY CARE | Facility: CLINIC | Age: 89
End: 2025-05-16

## 2025-05-16 VITALS
HEART RATE: 69 BPM | HEIGHT: 70 IN | DIASTOLIC BLOOD PRESSURE: 81 MMHG | OXYGEN SATURATION: 92 % | BODY MASS INDEX: 26.77 KG/M2 | WEIGHT: 187 LBS | SYSTOLIC BLOOD PRESSURE: 148 MMHG

## 2025-05-16 DIAGNOSIS — I10 ESSENTIAL HYPERTENSION: ICD-10-CM

## 2025-05-16 DIAGNOSIS — E78.00 PURE HYPERCHOLESTEROLEMIA: ICD-10-CM

## 2025-05-16 DIAGNOSIS — Z00.00 WELL ADULT EXAM: ICD-10-CM

## 2025-05-16 DIAGNOSIS — I25.10 CORONARY ARTERY DISEASE INVOLVING NATIVE CORONARY ARTERY OF NATIVE HEART WITHOUT ANGINA PECTORIS: Primary | ICD-10-CM

## 2025-05-16 DIAGNOSIS — E11.9 TYPE 2 DIABETES MELLITUS WITHOUT COMPLICATION, WITHOUT LONG-TERM CURRENT USE OF INSULIN: ICD-10-CM

## 2025-05-16 LAB
POC APPEARANCE, URINE: CLEAR
POC BILIRUBIN, URINE: NEGATIVE
POC BLOOD, URINE: NEGATIVE
POC COLOR, URINE: YELLOW
POC GLUCOSE, URINE: NEGATIVE MG/DL
POC KETONES, URINE: NEGATIVE MG/DL
POC LEUKOCYTES, URINE: ABNORMAL
POC NITRITE,URINE: NEGATIVE
POC PH, URINE: 7 PH
POC PROTEIN, URINE: NEGATIVE MG/DL
POC SPECIFIC GRAVITY, URINE: 1.01
POC UROBILINOGEN, URINE: 0.2 EU/DL

## 2025-05-16 PROCEDURE — 3079F DIAST BP 80-89 MM HG: CPT | Performed by: FAMILY MEDICINE

## 2025-05-16 PROCEDURE — 1036F TOBACCO NON-USER: CPT | Performed by: FAMILY MEDICINE

## 2025-05-16 PROCEDURE — G0439 PPPS, SUBSEQ VISIT: HCPCS | Performed by: FAMILY MEDICINE

## 2025-05-16 PROCEDURE — 81003 URINALYSIS AUTO W/O SCOPE: CPT | Performed by: FAMILY MEDICINE

## 2025-05-16 PROCEDURE — G8433 SCR FOR DEP NOT CPT DOC RSN: HCPCS | Performed by: FAMILY MEDICINE

## 2025-05-16 PROCEDURE — 99212 OFFICE O/P EST SF 10 MIN: CPT | Performed by: FAMILY MEDICINE

## 2025-05-16 PROCEDURE — 1159F MED LIST DOCD IN RCRD: CPT | Performed by: FAMILY MEDICINE

## 2025-05-16 PROCEDURE — 3077F SYST BP >= 140 MM HG: CPT | Performed by: FAMILY MEDICINE

## 2025-05-16 PROCEDURE — 1170F FXNL STATUS ASSESSED: CPT | Performed by: FAMILY MEDICINE

## 2025-05-16 ASSESSMENT — ACTIVITIES OF DAILY LIVING (ADL)
DRESSING: INDEPENDENT
MANAGING_FINANCES: INDEPENDENT
GROCERY_SHOPPING: INDEPENDENT
DOING_HOUSEWORK: INDEPENDENT
TAKING_MEDICATION: INDEPENDENT
BATHING: INDEPENDENT

## 2025-05-16 ASSESSMENT — COLUMBIA-SUICIDE SEVERITY RATING SCALE - C-SSRS
2. HAVE YOU ACTUALLY HAD ANY THOUGHTS OF KILLING YOURSELF?: NO
1. IN THE PAST MONTH, HAVE YOU WISHED YOU WERE DEAD OR WISHED YOU COULD GO TO SLEEP AND NOT WAKE UP?: NO
6. HAVE YOU EVER DONE ANYTHING, STARTED TO DO ANYTHING, OR PREPARED TO DO ANYTHING TO END YOUR LIFE?: NO

## 2025-05-16 NOTE — ASSESSMENT & PLAN NOTE
Keep off medication.  Cut back on carbohydrates.  Recheck with me in 6 months.    Orders:    POCT UA Automated manually resulted

## 2025-05-16 NOTE — PROGRESS NOTES
"Subjective   Reason for Visit: Deny Leiva is an 89 y.o. male here for a Medicare Wellness visit.     Past Medical, Surgical, and Family History reviewed and updated in chart.    Reviewed all medications by prescribing practitioner or clinical pharmacist (such as prescriptions, OTCs, herbal therapies and supplements) and documented in the medical record.    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 Colonoscopy in 9/24  by Dr. Leahy was normal. He recommended no further colonoscopies.     2. DENY is seen today also for follow up of coronary artery disease.  He is S/P angioplasty and stent placement. He is followed by Dr. Garcia.     3. DENY is here also for follow up of benign essential hypertension.  He is on lisinopril 5 mg.  (he has white coat syndrome).     4. DENY is seen today also for follow up of High cholesterol.  He is on simvastatin 2 mg and Zetia (started in 4/25). Recent LDL is 58.     5. DENY is seen today also for follow up of COPD.  He is well controlled on Advair.     6. DENY is seen also for follow up of impaired fasting glucose.  He is on no medication.   Recent A1c is 6.0.    7. DENY is seen also for follow up of colon cancer. .  Diagnosed in 2016 (adenocarcinoma of the sigmoid colon). He is S/P partial colectomy followed by chemotherapy.  Most recent colonoscopy in 9/24 was normal and it was recommended no further follow-up is needed.    Review of Systems  Denies headache, blurred vision, chest pain, shortness of breath, nausea or vomiting, change in bowel habits or leg pain or swelling.    Objective   Vitals:  /81 (BP Location: Right arm, Patient Position: Sitting, BP Cuff Size: Adult)   Pulse 69   Ht 1.778 m (5' 10\")   Wt 84.8 kg (187 lb)   SpO2 92%   BMI " 26.83 kg/m²       Physical Exam  General appearance: Vital signs have been reviewed.  Comfortable.  Well-nourished and well-developed.He is alert and oriented x3 and appears his stated age.The patient is cooperative with exam.  Head: Hair pattern reveals a normal pattern for patient's age and face shows no abnormalities.  Eyes: PERRLA x2, EOMI x2, conjunctive a and sclera are clear.  Ears: External bilateral ears with normal helix, tragus and earlobe.Bilateral canals are normal.Bilateral tympanic membranes are pearly gray and landmarks are well visualized.  Nose: Nasal mucosa both nostrils reveals no polyps, ulcerations or lesions.  Throat:Teeth are in good repair.  Posterior pharynx reveals no abnormalities.  Neck: Supple without lymphadenopathy, thyromegaly or carotid bruits.  Lungs:Clear to auscultation bilaterally with no wheezes, rales or rhonchi.  Cardiovascular: RRR without MRG.No carotid bruits.  Extremities without edema and pulses are intact.  Abdomen: Soft, NT, no masses, no hepatosplenomegaly.  Genitalia: Deferred  Rectal: Deferred.  Musculoskeletal: 5/5 and equal strength in bilateral upper and lower extremities.  Skin: Good turgor and without rashes.  Neurological: Good overall strength and no focal deficits.  Cranial nerves II through XII are grossly intact.  Psychiatric: Patient has appropriate judgment with good insight.  Mood is appropriate.    Assessment & Plan  Coronary artery disease involving native coronary artery of native heart without angina pectoris  Continue lisinopril.  Continue following with Dr. Garcia.         Essential hypertension  Continue lisinopril.  Recheck with me in 6 months.  Continue following with cardiologist.    Orders:    POCT UA Automated manually resulted    Pure hypercholesterolemia  Continue simvastatin and Zetia.  Continue low-fat diet.  Recheck with me in 6 months.    Orders:    POCT UA Automated manually resulted    Type 2 diabetes mellitus without complication,  without long-term current use of insulin  Keep off medication.  Cut back on carbohydrates.  Recheck with me in 6 months.    Orders:    POCT UA Automated manually resulted    Well adult exam  Anticipatory guidance given.

## 2025-05-16 NOTE — ASSESSMENT & PLAN NOTE
Continue simvastatin and Zetia.  Continue low-fat diet.  Recheck with me in 6 months.    Orders:    POCT UA Automated manually resulted

## 2025-05-16 NOTE — ASSESSMENT & PLAN NOTE
Continue lisinopril.  Recheck with me in 6 months.  Continue following with cardiologist.    Orders:    POCT UA Automated manually resulted

## 2025-06-23 DIAGNOSIS — N40.0 BENIGN PROSTATIC HYPERPLASIA WITHOUT LOWER URINARY TRACT SYMPTOMS: ICD-10-CM

## 2025-06-23 RX ORDER — TAMSULOSIN HYDROCHLORIDE 0.4 MG/1
0.4 CAPSULE ORAL DAILY
Qty: 90 CAPSULE | Refills: 3 | Status: SHIPPED | OUTPATIENT
Start: 2025-06-23

## 2025-06-25 ENCOUNTER — APPOINTMENT (OUTPATIENT)
Dept: PHYSICAL MEDICINE AND REHAB | Facility: CLINIC | Age: 89
End: 2025-06-25
Payer: MEDICARE

## 2025-06-25 VITALS
HEIGHT: 70 IN | TEMPERATURE: 97.5 F | BODY MASS INDEX: 26.77 KG/M2 | SYSTOLIC BLOOD PRESSURE: 167 MMHG | DIASTOLIC BLOOD PRESSURE: 79 MMHG | HEART RATE: 63 BPM | OXYGEN SATURATION: 96 % | WEIGHT: 187 LBS

## 2025-06-25 DIAGNOSIS — M48.061 SPINAL STENOSIS, LUMBAR REGION, WITHOUT NEUROGENIC CLAUDICATION: Primary | ICD-10-CM

## 2025-06-25 PROCEDURE — 99204 OFFICE O/P NEW MOD 45 MIN: CPT | Performed by: PHYSICAL MEDICINE & REHABILITATION

## 2025-06-25 PROCEDURE — G2211 COMPLEX E/M VISIT ADD ON: HCPCS | Performed by: PHYSICAL MEDICINE & REHABILITATION

## 2025-06-25 RX ORDER — GABAPENTIN 300 MG/1
600 CAPSULE ORAL 3 TIMES DAILY
Qty: 180 CAPSULE | Refills: 2 | Status: SHIPPED | OUTPATIENT
Start: 2025-06-25 | End: 2025-09-23

## 2025-06-25 ASSESSMENT — PAIN SCALES - GENERAL: PAINLEVEL_OUTOF10: 7

## 2025-06-25 NOTE — PATIENT INSTRUCTIONS
- Increase gabapentin slowly by 300 mg at a time as tolerated.  Start with 600 in the morning, 300 in the afternoon, and 600 at night.  Once you are tolerating this, then start taking 600 three times per day.  Consider going up higher eventually to 900 mg 3 times per day  -Consider other medications in the future  -Core exercises provided  - Lumbar MRI ordered  - Consider referral for a lumbar RAFFAELE versus MILD procedure  - Follow-up after MRI

## 2025-06-25 NOTE — PROGRESS NOTES
[Self-referral]    Chief complaint: radicular low back pain     Dear Dr. Mejia,    I had the pleasure of seeing your patient, Buck Leiva, in clinic today with his wife. As you know, He is a pleasant 89 y.o. right handed male with a past medical history of coronary artery disease, left knee replacement '22, colon cancer status post partial colectomy and chemotherapy, hypertension, diabetes, who presents for evaluation of low back pain.    TIMELINE OF  COMPLAINT(S):    Back pain has been most significant for the past 2 months but has had this pain for the past 8 years or so. No traumas/accidents. Very active, walks three times per week. No changes in activity levels, either increase or decrease. No pain, numbness, tingling down legs. Last saw GI one year, colonoscopy was clean at that time.     He remembers having spine injections with Dr. Claudio maybe 12 years ago, and he is not sure for what, maybe for a different kind of back pain, but does not remember if they helped.    Positive shopping cart sign.    Denies all red flags    Progress note from Dr. Mejia (primary care) dated 5/16/2025 reviewed today indicating he has a past medical history of diabetes, hypertension, coronary artery disease.    Pain:  LOCATION- Below belt line just right and left of midline either side. Hurt equally bad.   RADIATION- none  CONSTANT or INTERMITTENT- Constant with prolonged standing  SEVERITY/QUANTITY- 7 with standing, 0/10 now currently sitting  QUALITY- dull  WEAKNESS- No  NUMBNESS/TINGLING- No  ASSOCIATED WITH- None  EXACERBATED BY- Standing, cooking  BETTER WITH- Stretch out in recliner, Voltaren gel on back   TRIED-        Anti-Inflammatories: Voltaren on back did not help      Muscle relaxants: None      Anti-depressants: None      Neuroleptics: Gabapentin 600 BID (neuropathy ?  Chemotherapy-induced versus diabetic?)       LDN: None    PHYSICAL THERAPY:  Yes but in 2023; same pain as then, helped. Needling at PT; helped. But  no HEP from PT  CHIROPRACTIC MANIPULATION: No  TENS unit: No  ACUPUNCTURE TREATMENTS: No  DEEP TISSUE MASSAGE THERAPY: No  OSTEOPATHIC MANIPULATION THERAPY: No    EMG/NCS: None    INJECTIONS:   - Shoulder and knee injections with Dr. Tucker, helped  -1 or 2 back injections with Dr. Claudio about 12 years ago, does not remember if they helped       IMAGING: Yes, images and report personally reviewed interpreted today and discussed with the patient and his wife.  I showed him the images as well.  I used an anatomical spine model as well.    === 06/04/17 ===  MRI LUMBAR SPINE WO CONTRAST  - Impression -  1. Fairly severe central spinal stenosis at L4-5 due to  combination of a posterior central disc herniation measuring 5 mm and in AP and 14 mm in transverse dimension, right lateral hypertrophic facet osteoarthropathy with bilaterally hypertrophic ligamenta flava and also slight (3 mm) 73 listhesis of L4 on L5. There is also a left synovial cyst measuring 1 cm in AP and 0.5 cm in transverse dimension arising from the lower medial margin of left L4-5 facet joint causing additional left lateral thecal impingement.  2. Mild right neural foraminal stenosis at L5-S1 due to hypertrophic facet osteoarthropathy but no central spinal stenosis or herniated disc.    === 02/09/23 ===  HIP W PELVIS  - Impression -  1. Mild bilateral hip osteoarthrosis.    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      Lab Results   Component Value Date    HGBA1C 6.0 (H) 05/09/2025       FUNCTIONAL HISTORY: The patient is independent in all ADLs, mobility, and driving. The patient uses a cane occasionally when the pain is bad enough    SH:  Lives in: [place]  Lives with: Wife  Occupation: Retired  Tobacco: Former, quit over 45 years ago  Alcohol: Yes, 1-3 per week  Drugs: No. Medical marijuana, helps  __________________________________    ROS: The patient denies any bowel  or bladder incontinence/accidents, night sweats, fevers, chills, recent significant weight loss. A 14 point review of systems was reviewed with the patient and is as above and otherwise negative.  ROS questionnaire positive for vision changes, back pain    Physical Exam  Constitutional:           Comments: Lower back bilateral hip pain.          PHYSICAL EXAM    GEN - Alert, well-developed, well-nourished, no acute distress  PSYCH - Cooperative, appropriate mood and affect  HEENT - NC/AT  RESP - Non-labored respirations, equal expansion  CV - warm and well-perfused, No cyanosis or edema in extremities.  ABD- soft, ND  SKIN - No rash.    BACK/SPINE - Symmetric posture, no erythema, edema, or swelling. Full lumbar range of motion forward flexion, extension, sidebend, rotation without provocation of pain.. No pain with facet loading. No tenderness of lumbosacral spinous processes.  No tenderness over the lumbar paraspinal muscles. No tenderness over the iliolumbar ligaments bilaterally. No TTP of bilateral PSIS, sacral sulcus, gluteus medius, piriformis, greater trochanters, or IT band. Sacral compression negative bilaterally. Straight leg raise negative bilaterally.     HIPS/PELVIS - Symmetric in standing and lying.  Passive hip flexion,  and external rotation within functional normal limits bilaterally without provocation of pain symptoms. Internal rotation limited to <5 degrees bilaterally. Negative FABERs bilaterally. Negative FADIR bilaterally. Negative log roll.  No pain with deep hip flexion.     NEURO -   LE strength 5/5 -  including hip flexors, knee flexors, knee extensors, ankle dorsiflexors, ankle plantar flexors except for 4/5 left EHL   Sensation - intact to light touch in bilateral lower extremities.   Reflexes - 2+ patellar and diminished Achilles reflexes bilaterally.   GAIT - Normal base, somewhat diminished stride length and arm swing, non-antalgic.  He was able to toe walk and heel walk only while  holding onto me for balance.  He had more difficulty with toe walking on the left compared to right    IMPRESSION:    This is a pleasant 89-year-old right handed male with a past medical history of coronary artery disease, left knee replacement '22, colon cancer status post partial colectomy and chemotherapy, hypertension, diabetes, who presents for evaluation of low back pain.  Physical exam is notable for mild weakness in left EHL.  Symptoms and physical exam findings consistent with lumbar spinal stenosis without neurogenic claudication.    1. Patient Education: Extensive time was spent educating the patient on relevant anatomy, clinical findings and imaging, as well as discussing the potential diagnoses as discussed above.      2. Pharmacology: Avoid NSAIDs given age and history of heart disease. Currently taking gabapentin 600 BID, increase to 600 TID. Tapering instructions provided today.      3. Exercise: Provided instructions for core exercises today.      4. Imaging: Although low suspicion, given his history of cancer s/p surgical intervention and chemotherapy we will obtain lumbar MRI.      5. Interventional: None needed at this time. Consider referral for lumbar RAFFAELE vs. MILD.      6. Return to clinic for follow-up with me after completing MRI.         The patient expressed understanding and agreement with the assessment and plan. Patient encouraged to contact us should they have any questions, concerns, or any changes in symptoms.      Thank you for allowing me to participate in the care of your patient.       Erasmo Garcia, DO PGY3  Physical Medicine & Rehabilitation       Patient seen and discussed with the resident. I agree with the above assessment and plan.     ** Dictated with voice recognition software, please forgive any errors in grammar and/or spelling **

## 2025-06-25 NOTE — LETTER
June 25, 2025     Emilio Mejia MD  [address] [place] [address]  [facility]  [address]  [place] [address]    Patient: Buck Leiva   YOB: 1935   Date of Visit: 6/25/2025       Dear Dr. Emilio Mejia MD:    Thank you for referring Buck Leiva to me for evaluation. Below are my notes for this consultation.  If you have questions, please do not hesitate to call me. I look forward to following your patient along with you.       Sincerely,     Clarisa Sanchez MD      CC: No Recipients  ______________________________________________________________________________________    [Self-referral]    Chief complaint: radicular low back pain     Dear Dr. Mejia,    I had the pleasure of seeing your patient, Buck Leiva, in clinic today with his wife. As you know, He is a pleasant 89 y.o. right handed male with a past medical history of coronary artery disease, left knee replacement '22, colon cancer status post partial colectomy and chemotherapy, hypertension, diabetes, who presents for evaluation of low back pain.    TIMELINE OF  COMPLAINT(S):    Back pain has been most significant for the past 2 months but has had this pain for the past 8 years or so. No traumas/accidents. Very active, walks three times per week. No changes in activity levels, either increase or decrease. No pain, numbness, tingling down legs. Last saw GI one year, colonoscopy was clean at that time.     He remembers having spine injections with Dr. Claudio maybe 12 years ago, and he is not sure for what, maybe for a different kind of back pain, but does not remember if they helped.    Positive shopping cart sign.    Denies all red flags    Progress note from Dr. Mejia (primary care) dated 5/16/2025 reviewed today indicating he has a past medical history of diabetes, hypertension, coronary artery disease.    Pain:  LOCATION- Below belt line just right and left of midline either side. Hurt equally bad.   RADIATION- none  CONSTANT or  INTERMITTENT- Constant with prolonged standing  SEVERITY/QUANTITY- 7 with standing, 0/10 now currently sitting  QUALITY- dull  WEAKNESS- No  NUMBNESS/TINGLING- No  ASSOCIATED WITH- None  EXACERBATED BY- Standing, cooking  BETTER WITH- Stretch out in recliner, Voltaren gel on back   TRIED-        Anti-Inflammatories: Voltaren on back did not help      Muscle relaxants: None      Anti-depressants: None      Neuroleptics: Gabapentin 600 BID (neuropathy ?  Chemotherapy-induced versus diabetic?)       LDN: None    PHYSICAL THERAPY:  Yes but in 2023; same pain as then, helped. Needling at PT; helped. But no HEP from PT  CHIROPRACTIC MANIPULATION: No  TENS unit: No  ACUPUNCTURE TREATMENTS: No  DEEP TISSUE MASSAGE THERAPY: No  OSTEOPATHIC MANIPULATION THERAPY: No    EMG/NCS: None    INJECTIONS:   - Shoulder and knee injections with Dr. Tucker, helped  -1 or 2 back injections with Dr. Claudio about 12 years ago, does not remember if they helped       IMAGING: Yes, images and report personally reviewed interpreted today and discussed with the patient and his wife.  I showed him the images as well.  I used an anatomical spine model as well.    === 06/04/17 ===  MRI LUMBAR SPINE WO CONTRAST  - Impression -  1. Fairly severe central spinal stenosis at L4-5 due to  combination of a posterior central disc herniation measuring 5 mm and in AP and 14 mm in transverse dimension, right lateral hypertrophic facet osteoarthropathy with bilaterally hypertrophic ligamenta flava and also slight (3 mm) 73 listhesis of L4 on L5. There is also a left synovial cyst measuring 1 cm in AP and 0.5 cm in transverse dimension arising from the lower medial margin of left L4-5 facet joint causing additional left lateral thecal impingement.  2. Mild right neural foraminal stenosis at L5-S1 due to hypertrophic facet osteoarthropathy but no central spinal stenosis or herniated disc.    === 02/09/23 ===  HIP W PELVIS  - Impression -  1. Mild bilateral hip  osteoarthrosis.    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      Lab Results   Component Value Date    HGBA1C 6.0 (H) 05/09/2025       FUNCTIONAL HISTORY: The patient is independent in all ADLs, mobility, and driving. The patient uses a cane occasionally when the pain is bad enough    SH:  Lives in: [place]  Lives with: Wife  Occupation: Retired  Tobacco: Former, quit over 45 years ago  Alcohol: Yes, 1-3 per week  Drugs: No. Medical marijuana, helps  __________________________________    ROS: The patient denies any bowel or bladder incontinence/accidents, night sweats, fevers, chills, recent significant weight loss. A 14 point review of systems was reviewed with the patient and is as above and otherwise negative.  ROS questionnaire positive for vision changes, back pain    Physical Exam  Constitutional:           Comments: Lower back bilateral hip pain.          PHYSICAL EXAM    GEN - Alert, well-developed, well-nourished, no acute distress  PSYCH - Cooperative, appropriate mood and affect  HEENT - NC/AT  RESP - Non-labored respirations, equal expansion  CV - warm and well-perfused, No cyanosis or edema in extremities.  ABD- soft, ND  SKIN - No rash.    BACK/SPINE - Symmetric posture, no erythema, edema, or swelling. Full lumbar range of motion forward flexion, extension, sidebend, rotation without provocation of pain.. No pain with facet loading. No tenderness of lumbosacral spinous processes.  No tenderness over the lumbar paraspinal muscles. No tenderness over the iliolumbar ligaments bilaterally. No TTP of bilateral PSIS, sacral sulcus, gluteus medius, piriformis, greater trochanters, or IT band. Sacral compression negative bilaterally. Straight leg raise negative bilaterally.     HIPS/PELVIS - Symmetric in standing and lying.  Passive hip flexion,  and external rotation within functional normal limits bilaterally without  provocation of pain symptoms. Internal rotation limited to <5 degrees bilaterally. Negative FABERs bilaterally. Negative FADIR bilaterally. Negative log roll.  No pain with deep hip flexion.     NEURO -   LE strength 5/5 -  including hip flexors, knee flexors, knee extensors, ankle dorsiflexors, ankle plantar flexors except for 4/5 left EHL   Sensation - intact to light touch in bilateral lower extremities.   Reflexes - 2+ patellar and diminished Achilles reflexes bilaterally.   GAIT - Normal base, somewhat diminished stride length and arm swing, non-antalgic.  He was able to toe walk and heel walk only while holding onto me for balance.  He had more difficulty with toe walking on the left compared to right    IMPRESSION:    This is a pleasant 89-year-old right handed male with a past medical history of coronary artery disease, left knee replacement '22, colon cancer status post partial colectomy and chemotherapy, hypertension, diabetes, who presents for evaluation of low back pain.  Physical exam is notable for mild weakness in left EHL.  Symptoms and physical exam findings consistent with lumbar spinal stenosis without neurogenic claudication.    1. Patient Education: Extensive time was spent educating the patient on relevant anatomy, clinical findings and imaging, as well as discussing the potential diagnoses as discussed above.      2. Pharmacology: Avoid NSAIDs given age and history of heart disease. Currently taking gabapentin 600 BID, increase to 600 TID. Tapering instructions provided today.      3. Exercise: Provided instructions for core exercises today.      4. Imaging: Although low suspicion, given his history of cancer s/p surgical intervention and chemotherapy we will obtain lumbar MRI.      5. Interventional: None needed at this time. Consider referral for lumbar RAFFAELE vs. MILD.      6. Return to clinic for follow-up with me after completing MRI.         The patient expressed understanding and agreement  with the assessment and plan. Patient encouraged to contact us should they have any questions, concerns, or any changes in symptoms.      Thank you for allowing me to participate in the care of your patient.       Erasmo Garcia, DO PGY3  Physical Medicine & Rehabilitation       Patient seen and discussed with the resident. I agree with the above assessment and plan.     ** Dictated with voice recognition software, please forgive any errors in grammar and/or spelling **

## 2025-06-30 ENCOUNTER — TELEPHONE (OUTPATIENT)
Dept: PHYSICAL MEDICINE AND REHAB | Facility: CLINIC | Age: 89
End: 2025-06-30
Payer: MEDICARE

## 2025-06-30 NOTE — TELEPHONE ENCOUNTER
Cheyenne BURRIS - MRI is scheduled for 7/1    I called back, no answer.- DAJUAN to call back and schedule an MRI review.

## 2025-07-01 ENCOUNTER — HOSPITAL ENCOUNTER (OUTPATIENT)
Dept: RADIOLOGY | Facility: HOSPITAL | Age: 89
Discharge: HOME | End: 2025-07-01
Payer: MEDICARE

## 2025-07-01 DIAGNOSIS — M48.061 SPINAL STENOSIS, LUMBAR REGION, WITHOUT NEUROGENIC CLAUDICATION: ICD-10-CM

## 2025-07-01 PROCEDURE — 72148 MRI LUMBAR SPINE W/O DYE: CPT

## 2025-07-03 ENCOUNTER — TELEPHONE (OUTPATIENT)
Dept: PHYSICAL MEDICINE AND REHAB | Facility: CLINIC | Age: 89
End: 2025-07-03
Payer: MEDICARE

## 2025-07-03 NOTE — TELEPHONE ENCOUNTER
----- Message from Tori Stuart sent at 7/3/2025  1:50 PM EDT -----  Patients wife LVM calling you back  ----- Message -----  From: Clarisa Sanchez MD  Sent: 7/2/2025   4:41 PM EDT  To: Leslie Valencia MA    Please let him know that I got the results of his MRI and I will go over with him at follow-up.  Thank you  ----- Message -----  From: Interface, Radiology Results In  Sent: 7/2/2025   3:52 PM EDT  To: Clarisa Sanchez MD

## 2025-07-14 NOTE — PROGRESS NOTES
[Self-referral]    Chief complaint: radicular low back pain follow-up    This is a pleasant 89 y.o. right handed male with a past medical history of coronary artery disease, left knee replacement '22, colon cancer status post partial colectomy and chemotherapy, hypertension, diabetes, who presents for follow-up of low back pain.    Feels like pain has improved since last visit.    He was last seen here on 6/25/2025, at which point I advised him to increase gabapentin slowly and he is currently taking 600-300-600 mg in the afternoon.     I gave him some core exercises to do. Feels like they are helping    I ordered a lumbar MRI and this was done on 7/1/2025 and images and report personally reviewed interpreted today and discussed with the patient.  I showed him the images as well and used an anatomical spine model.  There was a new herniated disc to the right L2-3, moderate to severe L4-5 central stenosis and severe bilateral neuroforaminal narrowing.  And some synovial cysts projecting posterior laterally into the paraspinal fat and muscular layers.    === 07/01/25 ===  MR LUMBAR SPINE WO CONTRAST  ADDENDUM:  There is a septated synovial cyst arising from the right-sided facet joint and projecting posteriorly lateral into the paraspinal fat and muscular layers.  - Impression -  *Moderate to severe lumbar canal stenosis and bilateral foraminal narrowing at L4/L5 and severe bilateral foraminal stenosis.*Interval development of 2 mm/3 mm herniated nucleus polyposis or bulging intervertebral disc to the right at L2/L3 *Mild spondylosis at other levels is unchanged compared to the previous exam.        He rates his pain as 6-7/10, previously 7/10, mostly R hip pain.    Otherwise, there have been no changes to his medications or past medical history since last visit    _____________________________________  7/15/2025: Lumbar MRI reviewed, increase gisel to 600''', continue HEP, consider other meds, and injections, referral  to surgeon    ______________________________  As a reminder:    TIMELINE OF  COMPLAINT(S):    Back pain has been most significant for the past 2 months but has had this pain for the past 8 years or so. No traumas/accidents. Very active, walks three times per week. No changes in activity levels, either increase or decrease. No pain, numbness, tingling down legs. Last saw GI one year, colonoscopy was clean at that time.     He remembers having spine injections with Dr. Claudio maybe 12 years ago, and he is not sure for what, maybe for a different kind of back pain, but does not remember if they helped.    Positive shopping cart sign.    Denies all red flags    Progress note from Dr. Mejia (primary care) dated 5/16/2025 reviewed today indicating he has a past medical history of diabetes, hypertension, coronary artery disease.    Pain:  LOCATION- Below belt line just right and left of midline either side. Hurt equally bad.   RADIATION- none  CONSTANT or INTERMITTENT- Constant with prolonged standing  SEVERITY/QUANTITY- 7 with standing, 0/10 now currently sitting  QUALITY- dull  WEAKNESS- No  NUMBNESS/TINGLING- No  ASSOCIATED WITH- None  EXACERBATED BY- Standing, cooking  BETTER WITH- Stretch out in recliner, Voltaren gel on back   TRIED-        Anti-Inflammatories: Voltaren on back did not help      Muscle relaxants: None      Anti-depressants: None      Neuroleptics: Gabapentin 600 BID (neuropathy ?  Chemotherapy-induced versus diabetic?)       LDN: None    PHYSICAL THERAPY:  Yes but in 2023; same pain as then, helped. Needling at PT; helped. But no HEP from PT  CHIROPRACTIC MANIPULATION: No  TENS unit: No  ACUPUNCTURE TREATMENTS: No  DEEP TISSUE MASSAGE THERAPY: No  OSTEOPATHIC MANIPULATION THERAPY: No    EMG/NCS: None    INJECTIONS:   - Shoulder and knee injections with Dr. Tucker, helped  -1 or 2 back injections with Dr. Claudio about 12 years ago, does not remember if they helped       IMAGING: Yes, images and report  personally reviewed interpreted today and discussed with the patient and his wife.  I showed him the images as well.  I used an anatomical spine model as well.    === 06/04/17 ===  MRI LUMBAR SPINE WO CONTRAST  - Impression -  1. Fairly severe central spinal stenosis at L4-5 due to  combination of a posterior central disc herniation measuring 5 mm and in AP and 14 mm in transverse dimension, right lateral hypertrophic facet osteoarthropathy with bilaterally hypertrophic ligamenta flava and also slight (3 mm) 73 listhesis of L4 on L5. There is also a left synovial cyst measuring 1 cm in AP and 0.5 cm in transverse dimension arising from the lower medial margin of left L4-5 facet joint causing additional left lateral thecal impingement.  2. Mild right neural foraminal stenosis at L5-S1 due to hypertrophic facet osteoarthropathy but no central spinal stenosis or herniated disc.    === 02/09/23 ===  HIP W PELVIS  - Impression -  1. Mild bilateral hip osteoarthrosis.    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    === 07/01/25 ===  MR LUMBAR SPINE WO CONTRAST  ADDENDUM:  There is a septated synovial cyst arising from the right-sided facet joint and projecting posteriorly lateral into the paraspinal fat and muscular layers.  - Impression -  *Moderate to severe lumbar canal stenosis and bilateral foraminal narrowing at L4/L5 and severe bilateral foraminal stenosis.*Interval development of 2 mm/3 mm herniated nucleus polyposis or bulging intervertebral disc to the right at L2/L3 *Mild spondylosis at other levels is unchanged compared to the previous exam.      Lab Results   Component Value Date    HGBA1C 6.0 (H) 05/09/2025       FUNCTIONAL HISTORY: The patient is independent in all ADLs, mobility, and driving. The patient uses a cane occasionally when the pain is bad enough    SH:  Lives in: [place]  Lives with: Wife  Occupation:  Retired  Tobacco: Former, quit over 45 years ago  Alcohol: Yes, 1-3 per week  Drugs: No. Medical marijuana, helps  __________________________________    ROS: The patient denies any bowel or bladder incontinence/accidents, night sweats, fevers, chills, recent significant weight loss. A 14 point review of systems was reviewed with the patient and is as above and otherwise negative.  ROS questionnaire positive for vision changes, back pain         PHYSICAL EXAM    GEN - Alert, well-developed, well-nourished, no acute distress  PSYCH - Cooperative, appropriate mood and affect  HEENT - NC/AT  RESP - Non-labored respirations, equal expansion  CV - warm and well-perfused, No cyanosis or edema in extremities.  ABD- soft, ND  SKIN - No rash.    Previously:    BACK/SPINE - Symmetric posture, no erythema, edema, or swelling. Full lumbar range of motion forward flexion, extension, sidebend, rotation without provocation of pain.. No pain with facet loading. No tenderness of lumbosacral spinous processes.  No tenderness over the lumbar paraspinal muscles. No tenderness over the iliolumbar ligaments bilaterally. No TTP of bilateral PSIS, sacral sulcus, gluteus medius, piriformis, greater trochanters, or IT band. Sacral compression negative bilaterally. Straight leg raise negative bilaterally.     HIPS/PELVIS - Symmetric in standing and lying.  Passive hip flexion,  and external rotation within functional normal limits bilaterally without provocation of pain symptoms. Internal rotation limited to <5 degrees bilaterally. Negative FABERs bilaterally. Negative FADIR bilaterally. Negative log roll.  No pain with deep hip flexion.     NEURO -   LE strength 5/5 -  including hip flexors, knee flexors, knee extensors, ankle dorsiflexors, ankle plantar flexors except for 4/5 left EHL   Sensation - intact to light touch in bilateral lower extremities.   Reflexes - 2+ patellar and diminished Achilles reflexes bilaterally.   GAIT - Normal  base, somewhat diminished stride length and arm swing, non-antalgic.  He was able to toe walk and heel walk only while holding onto me for balance.  He had more difficulty with toe walking on the left compared to right    IMPRESSION:    This is a pleasant 89-year-old right handed male with a past medical history of coronary artery disease, left knee replacement '22, colon cancer status post partial colectomy and chemotherapy, hypertension, diabetes, who presents for follow-up of low back pain.  Physical exam is notable for mild weakness in left EHL.  Symptoms and physical exam findings consistent with lumbar spinal stenosis without neurogenic claudication.      - Increase gabapentin to 600 three times per day.  Consider going up higher eventually to 900 mg 3 times per day  -Consider other medications in the future  -Continue with the core exercises I provided last time  - Consider a Left greater trochanteric bursa injection in the future  - Consider referral for a lumbar RAFFAELE versus MILD procedure  -Consider referral to a surgeon as a last resort  - Follow-up 3 months        The patient expressed understanding and agreement with the assessment and plan. Patient encouraged to contact us should they have any questions, concerns, or any changes in symptoms.      Thank you for allowing me to participate in the care of your patient.       ** Dictated with voice recognition software, please forgive any errors in grammar and/or spelling **

## 2025-07-14 NOTE — PATIENT INSTRUCTIONS
- Increase gabapentin to 600 three times per day.  Consider going up higher eventually to 900 mg 3 times per day  -Consider other medications in the future  -Continue with the core exercises I provided last time  - Consider a Left greater trochanteric bursa injection in the future  - Consider referral for a lumbar RAFFAELE versus MILD procedure  -Consider referral to a surgeon as a last resort  - Follow-up 3 months

## 2025-07-15 ENCOUNTER — APPOINTMENT (OUTPATIENT)
Dept: PHYSICAL MEDICINE AND REHAB | Facility: CLINIC | Age: 89
End: 2025-07-15
Payer: MEDICARE

## 2025-07-15 VITALS
WEIGHT: 186 LBS | DIASTOLIC BLOOD PRESSURE: 73 MMHG | TEMPERATURE: 97.3 F | SYSTOLIC BLOOD PRESSURE: 151 MMHG | BODY MASS INDEX: 26.69 KG/M2 | HEART RATE: 75 BPM

## 2025-07-15 DIAGNOSIS — M48.061 SPINAL STENOSIS, LUMBAR REGION, WITHOUT NEUROGENIC CLAUDICATION: Primary | ICD-10-CM

## 2025-07-15 PROCEDURE — 3077F SYST BP >= 140 MM HG: CPT | Performed by: PHYSICAL MEDICINE & REHABILITATION

## 2025-07-15 PROCEDURE — 1160F RVW MEDS BY RX/DR IN RCRD: CPT | Performed by: PHYSICAL MEDICINE & REHABILITATION

## 2025-07-15 PROCEDURE — 99214 OFFICE O/P EST MOD 30 MIN: CPT | Performed by: PHYSICAL MEDICINE & REHABILITATION

## 2025-07-15 PROCEDURE — 1159F MED LIST DOCD IN RCRD: CPT | Performed by: PHYSICAL MEDICINE & REHABILITATION

## 2025-07-15 PROCEDURE — G2211 COMPLEX E/M VISIT ADD ON: HCPCS | Performed by: PHYSICAL MEDICINE & REHABILITATION

## 2025-07-15 PROCEDURE — 3078F DIAST BP <80 MM HG: CPT | Performed by: PHYSICAL MEDICINE & REHABILITATION

## 2025-07-15 RX ORDER — GABAPENTIN 600 MG/1
600 TABLET ORAL 3 TIMES DAILY
Qty: 90 TABLET | Refills: 3 | Status: SHIPPED | OUTPATIENT
Start: 2025-07-15 | End: 2025-11-12

## 2025-07-30 ENCOUNTER — TELEPHONE (OUTPATIENT)
Dept: PHYSICAL MEDICINE AND REHAB | Facility: CLINIC | Age: 89
End: 2025-07-30
Payer: MEDICARE

## 2025-07-30 NOTE — TELEPHONE ENCOUNTER
Patients wife called - he wants to schedule for a bursa injection, is this ok and is this with US?

## 2025-08-06 DIAGNOSIS — J45.909 ASTHMA, UNSPECIFIED ASTHMA SEVERITY, UNSPECIFIED WHETHER COMPLICATED, UNSPECIFIED WHETHER PERSISTENT (HHS-HCC): ICD-10-CM

## 2025-08-06 RX ORDER — ALBUTEROL SULFATE 4 MG/1
4 TABLET ORAL DAILY
Qty: 90 TABLET | Refills: 1 | Status: SHIPPED | OUTPATIENT
Start: 2025-08-06

## 2025-08-06 NOTE — PROGRESS NOTES
[Self-referral]    Chief complaint: radicular low back pain follow-up    This is a pleasant 89 y.o. right handed male with a past medical history of coronary artery disease, left knee replacement '22, colon cancer status post partial colectomy and chemotherapy, hypertension, diabetes, who presents for follow-up of low back pain.    He was last seen here on 7/15/2025, at which point I advised him to increase gabapentin slowly and he is currently taking 600''', going well. No side effects.     I advised him to continue his home exercises.    He is here today for left greater trochanteric bursa injection, Worse pain in the mornings, and walking, up stairs, sleeping on that side.     He rates his pain as 0/10 with sitting but up to 5/10 with walking and waking up in the morning.  Previously 7/10     Otherwise, there have been no changes to his medications or past medical history since last visit    _____________________________________  7/15/2025: Lumbar MRI reviewed, increase gisel to 600''', continue HEP, consider other meds, and injections, referral to surgeon  8/7/2025: Left greater trochanteric bursa injection, continue gabapentin, exercises, consider referral for RAFFAELE versus mild procedure    ______________________________  As a reminder:    TIMELINE OF  COMPLAINT(S):    Back pain has been most significant for the past 2 months but has had this pain for the past 8 years or so. No traumas/accidents. Very active, walks three times per week. No changes in activity levels, either increase or decrease. No pain, numbness, tingling down legs. Last saw GI one year, colonoscopy was clean at that time.     He remembers having spine injections with Dr. Claudio maybe 12 years ago, and he is not sure for what, maybe for a different kind of back pain, but does not remember if they helped.    Positive shopping cart sign.    Denies all red flags    Progress note from Dr. Mejia (primary care) dated 5/16/2025 reviewed today indicating  he has a past medical history of diabetes, hypertension, coronary artery disease.    Pain:  LOCATION- Below belt line just right and left of midline either side. Hurt equally bad.   RADIATION- none  CONSTANT or INTERMITTENT- Constant with prolonged standing  SEVERITY/QUANTITY- 7 with standing, 0/10 now currently sitting  QUALITY- dull  WEAKNESS- No  NUMBNESS/TINGLING- No  ASSOCIATED WITH- None  EXACERBATED BY- Standing, cooking  BETTER WITH- Stretch out in recliner, Voltaren gel on back   TRIED-        Anti-Inflammatories: Voltaren on back did not help      Muscle relaxants: None      Anti-depressants: None      Neuroleptics: Gabapentin 600 BID (neuropathy ?  Chemotherapy-induced versus diabetic?)       LDN: None    PHYSICAL THERAPY:  Yes but in 2023; same pain as then, helped. Needling at PT; helped. But no HEP from PT  CHIROPRACTIC MANIPULATION: No  TENS unit: No  ACUPUNCTURE TREATMENTS: No  DEEP TISSUE MASSAGE THERAPY: No  OSTEOPATHIC MANIPULATION THERAPY: No    EMG/NCS: None    INJECTIONS:   - Shoulder and knee injections with Dr. Tucker, helped  -1 or 2 back injections with Dr. Claudio about 12 years ago, does not remember if they helped       IMAGING: Yes, images and report personally reviewed interpreted today and discussed with the patient and his wife.  I showed him the images as well.  I used an anatomical spine model as well.    === 06/04/17 ===  MRI LUMBAR SPINE WO CONTRAST  - Impression -  1. Fairly severe central spinal stenosis at L4-5 due to  combination of a posterior central disc herniation measuring 5 mm and in AP and 14 mm in transverse dimension, right lateral hypertrophic facet osteoarthropathy with bilaterally hypertrophic ligamenta flava and also slight (3 mm) 73 listhesis of L4 on L5. There is also a left synovial cyst measuring 1 cm in AP and 0.5 cm in transverse dimension arising from the lower medial margin of left L4-5 facet joint causing additional left lateral thecal impingement.  2. Mild  right neural foraminal stenosis at L5-S1 due to hypertrophic facet osteoarthropathy but no central spinal stenosis or herniated disc.    === 02/09/23 ===  HIP W PELVIS  - Impression -  1. Mild bilateral hip osteoarthrosis.    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    === 07/01/25 ===  MR LUMBAR SPINE WO CONTRAST  ADDENDUM:  There is a septated synovial cyst arising from the right-sided facet joint and projecting posteriorly lateral into the paraspinal fat and muscular layers.  - Impression -  *Moderate to severe lumbar canal stenosis and bilateral foraminal narrowing at L4/L5 and severe bilateral foraminal stenosis.*Interval development of 2 mm/3 mm herniated nucleus polyposis or bulging intervertebral disc to the right at L2/L3 *Mild spondylosis at other levels is unchanged compared to the previous exam.      Lab Results   Component Value Date    HGBA1C 6.0 (H) 05/09/2025       FUNCTIONAL HISTORY: The patient is independent in all ADLs, mobility, and driving. The patient uses a cane occasionally when the pain is bad enough    SH:  Lives in: [place] [facility]  Lives with: Wife  Occupation: Retired  Tobacco: Former, quit over 45 years ago  Alcohol: Yes, 1-3 per week  Drugs: No. Medical marijuana, helps  __________________________________    ROS: The patient denies any bowel or bladder incontinence/accidents, night sweats, fevers, chills, recent significant weight loss. A 14 point review of systems was reviewed with the patient and is as above and otherwise negative.  ROS questionnaire  negative today     PHYSICAL EXAM    GEN - Alert, well-developed, well-nourished, no acute distress  PSYCH - Cooperative, appropriate mood and affect  HEENT - NC/AT  RESP - Non-labored respirations, equal expansion  CV - warm and well-perfused, No cyanosis or edema in extremities.  ABD- soft, ND  SKIN - No rash.    Previously:    BACK/SPINE - Symmetric posture, no  erythema, edema, or swelling. Full lumbar range of motion forward flexion, extension, sidebend, rotation without provocation of pain.. No pain with facet loading. No tenderness of lumbosacral spinous processes.  No tenderness over the lumbar paraspinal muscles. No tenderness over the iliolumbar ligaments bilaterally. No TTP of bilateral PSIS, sacral sulcus, gluteus medius, piriformis, greater trochanters, or IT band. Sacral compression negative bilaterally. Straight leg raise negative bilaterally.     HIPS/PELVIS - Symmetric in standing and lying.  Passive hip flexion,  and external rotation within functional normal limits bilaterally without provocation of pain symptoms. Internal rotation limited to <5 degrees bilaterally. Negative FABERs bilaterally. Negative FADIR bilaterally. Negative log roll.  No pain with deep hip flexion.     NEURO -   LE strength 5/5 -  including hip flexors, knee flexors, knee extensors, ankle dorsiflexors, ankle plantar flexors except for 4/5 left EHL   Sensation - intact to light touch in bilateral lower extremities.   Reflexes - 2+ patellar and diminished Achilles reflexes bilaterally.   GAIT - Normal base, somewhat diminished stride length and arm swing, non-antalgic.  He was able to toe walk and heel walk only while holding onto me for balance.  He had more difficulty with toe walking on the left compared to right    PROCEDURE:    Lidocaine HCI Injection 1% (500mg/50mL) (10mg/mL) 3 cc's, 0 ml's wasted   NDC 0427-8850-22  LOT [id number]  EXP 05/2026  Manu: Kendella     Kenalog 40- 1 cc's used, 0 cc's wasted   NDC 5804-6570-30  LOT [id number]  EXP 08/2027  Manuf: [employer]        Procedure:    Left greater trochanteric bursa corticosteroid injection:    Description of the Procedure: The procedure, risks and alternative treatments were discussed with the patient including the possibility that his sugars may temporarily increase over the next 2 weeks.  After written informed consent  was obtained, the area of most tenderness was identified over the left greater trochanteric bursa while the patient was on a side lying position on the right. The area was marked. The skin was prepped three times with alcohol. Using a 27 gauge 1.5 inch needle, after negative aspiration, the area was injected with a total of 3 cc of 1% lidocaine and 1 cc of Kenalog 40 MG. The patient tolerated the procedure well with no immediate complications or bleeding. There was mild reduction in pain after the procedure.    Plan:   1. The patient was instructed in post-procedural care.   2. The patient was asked to apply moist heat and or ice for the next 24 hours and to perform daily gentle stretching exercises.       Physical Exam  Constitutional:              IMPRESSION:    This is a pleasant 89-year-old right handed male with a past medical history of coronary artery disease, left knee replacement '22, colon cancer status post partial colectomy and chemotherapy, hypertension, diabetes, who presents for follow-up of low back pain.  Physical exam is notable for mild weakness in left EHL.  Symptoms and physical exam findings consistent with lumbar spinal stenosis without neurogenic claudication.    -Left greater trochanteric bursa injection performed as above.  There were no complications and he tolerated the procedure well.  He was provided with postprocedure instructions including to monitor his sugars closely over the next 2 weeks.  - Continue gabapentin 600 three times per day.  Consider going up higher eventually to 900 mg 3 times per day  -Consider other medications in the future  -Hip exercises provided and continue with the core exercises I provided last time  - Consider referral for a lumbar RAFFAELE versus MILD procedure  -Consider referral to a surgeon as a last resort  - Follow-up 6-8 weeks        The patient expressed understanding and agreement with the assessment and plan. Patient encouraged to contact us should they  have any questions, concerns, or any changes in symptoms.      Thank you for allowing me to participate in the care of your patient.       ** Dictated with voice recognition software, please forgive any errors in grammar and/or spelling **

## 2025-08-06 NOTE — PATIENT INSTRUCTIONS
-Ice on and off for the next 24 hours if injection sites are sore. Do gentle range of motion exercises in each area that was injected. Try to do them every hour for about half a minute or so, in every direction that the affected part goes. No pool, bath, or hot tub today. Avoid heavy lifting for the next 2 days.  Monitor your sugars closely over the next 2 weeks  - Continue gabapentin 600 three times per day.  Consider going up higher eventually to 900 mg 3 times per day  -Consider other medications in the future  -Hip exercises provided and continue with the core exercises I provided last time  - Consider referral for a lumbar RAFFAELE versus MILD procedure  -Consider referral to a surgeon as a last resort  - Follow-up 6-8 weeks

## 2025-08-07 ENCOUNTER — PROCEDURE VISIT (OUTPATIENT)
Dept: PHYSICAL MEDICINE AND REHAB | Facility: CLINIC | Age: 89
End: 2025-08-07
Payer: MEDICARE

## 2025-08-07 VITALS
HEART RATE: 89 BPM | TEMPERATURE: 97.4 F | HEIGHT: 70 IN | WEIGHT: 186 LBS | BODY MASS INDEX: 26.63 KG/M2 | SYSTOLIC BLOOD PRESSURE: 140 MMHG | DIASTOLIC BLOOD PRESSURE: 82 MMHG | OXYGEN SATURATION: 95 %

## 2025-08-07 DIAGNOSIS — M70.62 TROCHANTERIC BURSITIS OF LEFT HIP: Primary | ICD-10-CM

## 2025-08-07 DIAGNOSIS — M48.061 SPINAL STENOSIS, LUMBAR REGION, WITHOUT NEUROGENIC CLAUDICATION: ICD-10-CM

## 2025-08-07 PROCEDURE — 20551 NJX 1 TENDON ORIGIN/INSJ: CPT | Performed by: PHYSICAL MEDICINE & REHABILITATION

## 2025-08-07 PROCEDURE — 96372 THER/PROPH/DIAG INJ SC/IM: CPT | Performed by: PHYSICAL MEDICINE & REHABILITATION

## 2025-08-07 RX ORDER — TRIAMCINOLONE ACETONIDE 40 MG/ML
40 INJECTION, SUSPENSION INTRA-ARTICULAR; INTRAMUSCULAR ONCE
Status: COMPLETED | OUTPATIENT
Start: 2025-08-07 | End: 2025-08-07

## 2025-08-07 RX ADMIN — TRIAMCINOLONE ACETONIDE 40 MG: 40 INJECTION, SUSPENSION INTRA-ARTICULAR; INTRAMUSCULAR at 09:16

## 2025-08-07 ASSESSMENT — PAIN SCALES - GENERAL: PAINLEVEL_OUTOF10: 5

## 2025-08-19 ENCOUNTER — APPOINTMENT (OUTPATIENT)
Dept: PHYSICAL MEDICINE AND REHAB | Facility: CLINIC | Age: 89
End: 2025-08-19
Payer: MEDICARE

## 2025-09-25 ENCOUNTER — APPOINTMENT (OUTPATIENT)
Dept: PHYSICAL MEDICINE AND REHAB | Facility: CLINIC | Age: 89
End: 2025-09-25
Payer: MEDICARE

## 2025-10-21 ENCOUNTER — APPOINTMENT (OUTPATIENT)
Dept: PHYSICAL MEDICINE AND REHAB | Facility: CLINIC | Age: 89
End: 2025-10-21
Payer: MEDICARE

## 2025-11-20 ENCOUNTER — APPOINTMENT (OUTPATIENT)
Dept: PRIMARY CARE | Facility: CLINIC | Age: 89
End: 2025-11-20
Payer: MEDICARE